# Patient Record
Sex: FEMALE | Race: BLACK OR AFRICAN AMERICAN | NOT HISPANIC OR LATINO | Employment: FULL TIME | URBAN - NONMETROPOLITAN AREA
[De-identification: names, ages, dates, MRNs, and addresses within clinical notes are randomized per-mention and may not be internally consistent; named-entity substitution may affect disease eponyms.]

---
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
## 2022-01-31 DIAGNOSIS — Z34.02 ENCOUNTER FOR SUPERVISION OF NORMAL FIRST PREGNANCY IN SECOND TRIMESTER: Primary | ICD-10-CM

## 2022-01-31 LAB
APPEARANCE, UA: CLEAR
BILIRUB UR QL STRIP: NEGATIVE MG/DL
COLOR UR: NORMAL
GLUCOSE (UA): NORMAL MG/DL
HGB UR QL STRIP: NEGATIVE /UL
KETONES UR QL STRIP: NEGATIVE MG/DL
LEUKOCYTE ESTERASE UR QL STRIP: NEGATIVE /UL
NITRITE UR QL STRIP: NEGATIVE
PH UR STRIP: 6 PH (ref 5–9)
PROT UR QL STRIP: NEGATIVE MG/DL
SP GR UR STRIP: 1.01 (ref 1–1.03)
SPECIMEN COLLECTION METHOD, URINE: NORMAL
UROBILINOGEN UR STRIP-ACNC: NORMAL MG/DL

## 2022-02-08 ENCOUNTER — PROCEDURE VISIT (OUTPATIENT)
Dept: OBSTETRICS AND GYNECOLOGY | Facility: CLINIC | Age: 22
End: 2022-02-08
Payer: MEDICAID

## 2022-02-08 ENCOUNTER — ROUTINE PRENATAL (OUTPATIENT)
Dept: OBSTETRICS AND GYNECOLOGY | Facility: CLINIC | Age: 22
End: 2022-02-08
Payer: MEDICAID

## 2022-02-08 VITALS — DIASTOLIC BLOOD PRESSURE: 80 MMHG | SYSTOLIC BLOOD PRESSURE: 127 MMHG | HEART RATE: 94 BPM | WEIGHT: 143 LBS

## 2022-02-08 DIAGNOSIS — Z3A.23 23 WEEKS GESTATION OF PREGNANCY: ICD-10-CM

## 2022-02-08 DIAGNOSIS — O09.32 LIMITED PRENATAL CARE IN SECOND TRIMESTER: ICD-10-CM

## 2022-02-08 DIAGNOSIS — Z34.02 ENCOUNTER FOR SUPERVISION OF NORMAL FIRST PREGNANCY IN SECOND TRIMESTER: Primary | ICD-10-CM

## 2022-02-08 DIAGNOSIS — Z34.02 ENCOUNTER FOR SUPERVISION OF NORMAL FIRST PREGNANCY IN SECOND TRIMESTER: ICD-10-CM

## 2022-02-08 PROCEDURE — 76805 OB US >/= 14 WKS SNGL FETUS: CPT | Mod: S$GLB,,, | Performed by: STUDENT IN AN ORGANIZED HEALTH CARE EDUCATION/TRAINING PROGRAM

## 2022-02-08 PROCEDURE — 76805 US OB/GYN PROCEDURE (VIEWPOINT): ICD-10-PCS | Mod: S$GLB,,, | Performed by: STUDENT IN AN ORGANIZED HEALTH CARE EDUCATION/TRAINING PROGRAM

## 2022-02-08 PROCEDURE — 99213 OFFICE O/P EST LOW 20 MIN: CPT | Mod: 25,TH,S$GLB, | Performed by: STUDENT IN AN ORGANIZED HEALTH CARE EDUCATION/TRAINING PROGRAM

## 2022-02-08 PROCEDURE — 99213 PR OFFICE/OUTPT VISIT, EST, LEVL III, 20-29 MIN: ICD-10-PCS | Mod: 25,TH,S$GLB, | Performed by: STUDENT IN AN ORGANIZED HEALTH CARE EDUCATION/TRAINING PROGRAM

## 2022-02-08 NOTE — PROGRESS NOTES
CC: Follow-Up OB    HPI:   21 y.o.  at 23w4d with EDC of 6/3/2022, by 8w Ultrasound, here for her follow up OB visit. Complains of nothing today. Pt has not been seen since her initial OB visit at 9 weeks.     Pregnancy ROS:  Positive fetal movement   Negative leakage of fluid   Negative vaginal bleeding   Negative headache, vision changes, RUQ pain, epigastric pain  Negative contractions/abdominal pain   Negative pelvic pressure     Physical Exam:  Prenatal Vitals  BP: 127/80  Weight: 64.9 kg (143 lb)  Urine Glucose: Negative  Urine Albumin: Negative    Wt Readings from Last 3 Encounters:   22 64.9 kg (143 lb) (74 %, Z= 0.63)*   21 68.9 kg (152 lb) (83 %, Z= 0.95)*     * Growth percentiles are based on CDC (Girls, 2-20 Years) data.     There is no height or weight on file to calculate BMI.    General: NAD, well developed, well nourished  Psych: alert and oriented to person, time and place, normal affect  HEENT: normocephalic, atraumatic  Abd: Gravid, soft, NT, ND  Skin: warm, dry  Neuro: normal gait, gross motor function intact  No cyanosis, clubbing or edema    FHTs: 155 on US    Maternal Blood Type: O+/-    ASSESSMENT: 21 y.o.  @ 23w4d with   Patient Active Problem List   Diagnosis    Anemia    Encounter for supervision of normal pregnancy in first trimester    Heart murmur     PLAN:  NIPT today  Osull on RTC  Pt encouraged to come to her appts  Anatomy scan today  Pain, fever, bleeding precautions  [facility], REECE, PreE precautions  Cont PNV, Iron  Return to clinic in 2 weeks

## 2022-02-12 ENCOUNTER — PATIENT MESSAGE (OUTPATIENT)
Dept: OBSTETRICS AND GYNECOLOGY | Facility: CLINIC | Age: 22
End: 2022-02-12
Payer: MEDICAID

## 2022-02-22 ENCOUNTER — ROUTINE PRENATAL (OUTPATIENT)
Dept: OBSTETRICS AND GYNECOLOGY | Facility: CLINIC | Age: 22
End: 2022-02-22
Payer: MEDICAID

## 2022-02-22 VITALS — SYSTOLIC BLOOD PRESSURE: 124 MMHG | HEART RATE: 90 BPM | DIASTOLIC BLOOD PRESSURE: 81 MMHG | WEIGHT: 140 LBS

## 2022-02-22 DIAGNOSIS — Z34.02 ENCOUNTER FOR SUPERVISION OF NORMAL FIRST PREGNANCY IN SECOND TRIMESTER: Primary | ICD-10-CM

## 2022-02-22 DIAGNOSIS — Z3A.25 25 WEEKS GESTATION OF PREGNANCY: ICD-10-CM

## 2022-02-22 PROCEDURE — 99213 PR OFFICE/OUTPT VISIT, EST, LEVL III, 20-29 MIN: ICD-10-PCS | Mod: TH,S$GLB,, | Performed by: STUDENT IN AN ORGANIZED HEALTH CARE EDUCATION/TRAINING PROGRAM

## 2022-02-22 PROCEDURE — 99213 OFFICE O/P EST LOW 20 MIN: CPT | Mod: TH,S$GLB,, | Performed by: STUDENT IN AN ORGANIZED HEALTH CARE EDUCATION/TRAINING PROGRAM

## 2022-02-22 NOTE — LETTER
February 22, 2022      [facility]) - OB GYN  [address]  [place] [address]  Phone: [phone]  Fax: [phone]       Patient: Abhay Schilling   YOB: 2000  Date of Visit: 02/22/2022    To Whom It May Concern:    Az Schilling  was at [facility] [employer] on 02/22/2022. The patient may work without restrictions. If you have any questions or concerns, or if I can be of further assistance, please do not hesitate to contact me.    Sincerely,      MD Arianna Tan LPN

## 2022-02-22 NOTE — PROGRESS NOTES
CC: Follow-Up OB    HPI:   21 y.o.  at 25w4d with EDC of 6/3/2022, by 8w Ultrasound, here for her follow up OB visit. Complains of nothing today.    Pregnancy ROS:  Positive fetal movement   Negative leakage of fluid   Negative vaginal bleeding   Negative headache, vision changes, RUQ pain, epigastric pain  Negative contractions/abdominal pain   Negative pelvic pressure     Physical Exam:  Prenatal Vitals  BP: 124/81  Weight: 63.5 kg (140 lb)  Urine Glucose: Negative  Urine Albumin: Negative    Wt Readings from Last 3 Encounters:   22 63.5 kg (140 lb) (70 %, Z= 0.52)*   22 64.9 kg (143 lb) (74 %, Z= 0.63)*   21 68.9 kg (152 lb) (83 %, Z= 0.95)*     * Growth percentiles are based on [facility] (Girls, 2-20 Years) data.     There is no height or weight on file to calculate BMI.    General: NAD, well developed, well nourished  Psych: alert and oriented to person, time and place, normal affect  HEENT: normocephalic, atraumatic  Abd: Gravid, soft, NT, ND  Skin: warm, dry  Neuro: normal gait, gross motor function intact  No cyanosis, clubbing or edema    FHTs: 150's    Maternal Blood Type: O+/-    ASSESSMENT: 21 y.o.  @ 25w4d with   Patient Active Problem List   Diagnosis    Anemia    Encounter for supervision of normal pregnancy in first trimester    Heart murmur     PLAN:  NIPT and OSull today  3rd trimester labs and tdap on RTC   Pain, fever, bleeding precautions  [facility], REECE, PreE precautions  Cont PNV, Iron  Return to clinic in 3 weeks

## 2022-03-02 ENCOUNTER — TELEPHONE (OUTPATIENT)
Dept: OBSTETRICS AND GYNECOLOGY | Facility: CLINIC | Age: 22
End: 2022-03-02
Payer: MEDICAID

## 2022-03-02 NOTE — TELEPHONE ENCOUNTER
Pt advised of NIPT results, Pt educated on Labor precautions regarding concerns of pain, pt voiced understaning.     ----- Message from Michele Claudio sent at 3/2/2022 12:54 PM CST -----  Contact: Patient  Patient need the nurse to call her      Call back #  [phone]

## 2022-03-15 ENCOUNTER — ROUTINE PRENATAL (OUTPATIENT)
Dept: OBSTETRICS AND GYNECOLOGY | Facility: CLINIC | Age: 22
End: 2022-03-15
Payer: MEDICAID

## 2022-03-15 VITALS — DIASTOLIC BLOOD PRESSURE: 83 MMHG | WEIGHT: 144 LBS | HEART RATE: 89 BPM | SYSTOLIC BLOOD PRESSURE: 121 MMHG

## 2022-03-15 DIAGNOSIS — Z34.03 ENCOUNTER FOR SUPERVISION OF NORMAL FIRST PREGNANCY IN THIRD TRIMESTER: Primary | ICD-10-CM

## 2022-03-15 DIAGNOSIS — Z3A.28 28 WEEKS GESTATION OF PREGNANCY: ICD-10-CM

## 2022-03-15 LAB
ABS NRBC COUNT: 0 X 10 3/UL (ref 0–0.01)
ABSOLUTE BASOPHIL: 0.05 X 10 3/UL (ref 0–0.22)
ABSOLUTE EOSINOPHIL: 0.06 X 10 3/UL (ref 0.04–0.54)
ABSOLUTE IMMATURE GRAN: 0.07 X 10 3/UL (ref 0–0.04)
ABSOLUTE LYMPHOCYTE: 1.48 X 10 3/UL (ref 0.86–4.75)
ABSOLUTE MONOCYTE: 1.16 X 10 3/UL (ref 0.22–1.08)
BASOPHILS NFR BLD: 0.4 % (ref 0.2–1.2)
EOSINOPHIL NFR BLD: 0.5 % (ref 0.7–7)
HCT VFR BLD AUTO: 31 % (ref 37–47)
HGB BLD-MCNC: 8.8 G/DL (ref 12–16)
HIV 1+2 AB+HIV1 P24 AG SERPL QL IA: NONREACTIVE
IMMATURE GRANULOCYTES: 0.6 % (ref 0–0.5)
LYMPHOCYTES NFR BLD: 12.7 % (ref 19.3–53.1)
MCH RBC QN AUTO: 19.2 PG (ref 27–32)
MCHC RBC AUTO-ENTMCNC: 28.4 G/DL (ref 32–36)
MCV RBC AUTO: 67.7 FL (ref 82–100)
MONOCYTES NFR BLD: 10 % (ref 4.7–12.5)
NEUTROPHILS # BLD AUTO: 8.8 X 10 3/UL (ref 2.15–7.56)
NEUTROPHILS NFR BLD: 75.8 % (ref 34–71.1)
NUCLEATED RED BLOOD CELLS: 0 /100 WBC (ref 0–0.2)
PLATELET # BLD AUTO: 200 X 10 3/UL (ref 135–400)
RBC # BLD AUTO: 4.58 X 10 6/UL (ref 4.2–5.4)
RDW-SD: 43.6 FL (ref 37–54)
SYPHILIS TREPONEMAL ANTIBODY: NONREACTIVE
WBC # BLD: 11.62 X 10 3/UL (ref 4.3–10.8)

## 2022-03-15 PROCEDURE — 90471 TDAP VACCINE GREATER THAN OR EQUAL TO 7YO IM: ICD-10-PCS | Mod: S$GLB,,, | Performed by: STUDENT IN AN ORGANIZED HEALTH CARE EDUCATION/TRAINING PROGRAM

## 2022-03-15 PROCEDURE — 99213 PR OFFICE/OUTPT VISIT, EST, LEVL III, 20-29 MIN: ICD-10-PCS | Mod: TH,25,S$GLB, | Performed by: STUDENT IN AN ORGANIZED HEALTH CARE EDUCATION/TRAINING PROGRAM

## 2022-03-15 PROCEDURE — 99213 OFFICE O/P EST LOW 20 MIN: CPT | Mod: TH,25,S$GLB, | Performed by: STUDENT IN AN ORGANIZED HEALTH CARE EDUCATION/TRAINING PROGRAM

## 2022-03-15 PROCEDURE — 90715 TDAP VACCINE 7 YRS/> IM: CPT | Mod: S$GLB,,, | Performed by: STUDENT IN AN ORGANIZED HEALTH CARE EDUCATION/TRAINING PROGRAM

## 2022-03-15 PROCEDURE — 90471 IMMUNIZATION ADMIN: CPT | Mod: S$GLB,,, | Performed by: STUDENT IN AN ORGANIZED HEALTH CARE EDUCATION/TRAINING PROGRAM

## 2022-03-15 PROCEDURE — 90715 TDAP VACCINE GREATER THAN OR EQUAL TO 7YO IM: ICD-10-PCS | Mod: S$GLB,,, | Performed by: STUDENT IN AN ORGANIZED HEALTH CARE EDUCATION/TRAINING PROGRAM

## 2022-03-15 NOTE — PROGRESS NOTES
CC: Follow-Up OB    HPI:   21 y.o.  at 28w4d with EDC of 6/3/2022, by 8w Ultrasound, here for her follow up OB visit. Complains of nothing today. She does report occasional cramping when she works.     Pregnancy ROS:  Positive fetal movement   Negative leakage of fluid   Negative vaginal bleeding   Negative headache, vision changes, RUQ pain, epigastric pain  Negative contractions/abdominal pain   Negative pelvic pressure     Physical Exam:  Prenatal Vitals  BP: 121/83  Weight: 65.3 kg (144 lb)  Urine Glucose: Negative  Urine Albumin: Negative    Wt Readings from Last 3 Encounters:   03/15/22 65.3 kg (144 lb) (74 %, Z= 0.66)*   22 63.5 kg (140 lb) (70 %, Z= 0.52)*   22 64.9 kg (143 lb) (74 %, Z= 0.63)*     * Growth percentiles are based on [facility] (Girls, 2-20 Years) data.     There is no height or weight on file to calculate BMI.    General: NAD, well developed, well nourished  Psych: alert and oriented to person, time and place, normal affect  HEENT: normocephalic, atraumatic  Abd: Gravid, soft, NT, ND  Skin: warm, dry  Neuro: normal gait, gross motor function intact  No cyanosis, clubbing or edema    FHTs: 140's    Maternal Blood Type: O+/-    ASSESSMENT: 21 y.o.  @ 28w4d with   Patient Active Problem List   Diagnosis    Anemia    Encounter for supervision of normal pregnancy in first trimester    Heart murmur     PLAN:  NIPT negative  Osull wnl  3rd trimester labs and tdap today  Pain, fever, bleeding precautions  VAMSHI, REECE, PreE precautions  Cont PNV, Iron  Return to clinic in 3 weeks

## 2022-03-16 RX ORDER — FERROUS SULFATE 325(65) MG
325 TABLET, DELAYED RELEASE (ENTERIC COATED) ORAL 2 TIMES DAILY
Qty: 60 TABLET | Refills: 2 | Status: SHIPPED | OUTPATIENT
Start: 2022-03-16 | End: 2022-06-14

## 2022-03-23 ENCOUNTER — PATIENT MESSAGE (OUTPATIENT)
Dept: OBSTETRICS AND GYNECOLOGY | Facility: CLINIC | Age: 22
End: 2022-03-23
Payer: MEDICAID

## 2022-03-26 ENCOUNTER — HISTORICAL (OUTPATIENT)
Dept: ADMINISTRATIVE | Facility: HOSPITAL | Age: 22
End: 2022-03-26

## 2022-03-29 ENCOUNTER — TELEPHONE (OUTPATIENT)
Dept: OBSTETRICS AND GYNECOLOGY | Facility: CLINIC | Age: 22
End: 2022-03-29
Payer: MEDICAID

## 2022-03-29 NOTE — TELEPHONE ENCOUNTER
----- Message from Radha White sent at 3/29/2022  9:00 AM CDT -----  Regarding: pt advice  Contact: Pt  Pt states that since her last visit she has been seen in the ER twice. States that on the 1st ER she was put on bed rest and on the 2nd ER visit she was told she has pre-eclampsia. Pt states that she has been unable to eat or drank due to vomiting. Please call back at [phone]//thank you acc

## 2022-03-29 NOTE — TELEPHONE ENCOUNTER
----- Message from Radha White sent at 3/29/2022  9:00 AM CDT -----  Regarding: pt advice  Contact: Pt  Pt states that since her last visit she has been seen in the ER twice. States that on the 1st ER she was put on bed rest and on the 2nd ER visit she was told she has pre-eclampsia. Pt states that she has been unable to eat or drank due to vomiting. Please call back at [phone]//thank you acc

## 2022-03-31 ENCOUNTER — ROUTINE PRENATAL (OUTPATIENT)
Dept: OBSTETRICS AND GYNECOLOGY | Facility: CLINIC | Age: 22
End: 2022-03-31
Payer: MEDICAID

## 2022-03-31 VITALS — WEIGHT: 139 LBS | HEART RATE: 89 BPM | SYSTOLIC BLOOD PRESSURE: 130 MMHG | DIASTOLIC BLOOD PRESSURE: 84 MMHG

## 2022-03-31 DIAGNOSIS — Z34.03 ENCOUNTER FOR SUPERVISION OF NORMAL FIRST PREGNANCY IN THIRD TRIMESTER: ICD-10-CM

## 2022-03-31 DIAGNOSIS — O21.9 NAUSEA AND VOMITING IN PREGNANCY: Primary | ICD-10-CM

## 2022-03-31 DIAGNOSIS — Z3A.30 30 WEEKS GESTATION OF PREGNANCY: ICD-10-CM

## 2022-03-31 PROCEDURE — 99213 PR OFFICE/OUTPT VISIT, EST, LEVL III, 20-29 MIN: ICD-10-PCS | Mod: TH,S$GLB,, | Performed by: STUDENT IN AN ORGANIZED HEALTH CARE EDUCATION/TRAINING PROGRAM

## 2022-03-31 PROCEDURE — 99213 OFFICE O/P EST LOW 20 MIN: CPT | Mod: TH,S$GLB,, | Performed by: STUDENT IN AN ORGANIZED HEALTH CARE EDUCATION/TRAINING PROGRAM

## 2022-03-31 RX ORDER — PROMETHAZINE HYDROCHLORIDE 12.5 MG/1
12.5 SUPPOSITORY RECTAL EVERY 6 HOURS PRN
Qty: 12 SUPPOSITORY | Refills: 1 | Status: SHIPPED | OUTPATIENT
Start: 2022-03-31 | End: 2023-03-31

## 2022-03-31 RX ORDER — ONDANSETRON 4 MG/1
4 TABLET, FILM COATED ORAL EVERY 6 HOURS PRN
Qty: 30 TABLET | Refills: 1 | Status: SHIPPED | OUTPATIENT
Start: 2022-03-31 | End: 2023-03-31

## 2022-03-31 RX ORDER — PROMETHAZINE HYDROCHLORIDE 25 MG/1
25 TABLET ORAL EVERY 6 HOURS
Qty: 28 TABLET | Refills: 0 | Status: SHIPPED | OUTPATIENT
Start: 2022-03-31 | End: 2022-04-07

## 2022-03-31 NOTE — PROGRESS NOTES
CC: Follow-Up OB    HPI:   21 y.o.  at 30w6d with EDC of 6/3/2022, by Ultrasound, here for her follow up OB visit. Complains of n/v, reports difficulty holding down meals and liquids. She was seen in the ED twice, given zofran however she has trouble keeping it down. 6 lb weight loss from previous visit.    Pregnancy ROS:  Positive fetal movement   Negative leakage of fluid   Negative vaginal bleeding   Negative headache, vision changes, RUQ pain, epigastric pain  Negative contractions/abdominal pain   Negative pelvic pressure     Physical Exam:  Prenatal Vitals  BP: 130/84  Weight: 63 kg (139 lb)  Urine Glucose: Negative  Urine Albumin: Negative    Wt Readings from Last 3 Encounters:   22 63 kg (139 lb) (68 %, Z= 0.47)*   03/15/22 65.3 kg (144 lb) (74 %, Z= 0.66)*   22 63.5 kg (140 lb) (70 %, Z= 0.52)*     * Growth percentiles are based on CDC (Girls, 2-20 Years) data.     There is no height or weight on file to calculate BMI.    General: NAD, well developed, well nourished  Psych: alert and oriented to person, time and place, normal affect  HEENT: normocephalic, atraumatic  Abd: Gravid, soft, NT, ND  Skin: warm, dry  Neuro: normal gait, gross motor function intact  No cyanosis, clubbing or edema    FHTs: 150's    Maternal Blood Type: O+/-    ASSESSMENT: 21 y.o.  @ 30w6d with   Patient Active Problem List   Diagnosis    Anemia    Encounter for supervision of normal pregnancy in first trimester    Heart murmur     PLAN:  Pt with n/v, will treat with zofran and phenergan  Will Po phenergan and suppositories  If pt is still having difficulty holding down liquids will admit pt to [facility] For IVF and antiemetics  Continue iron BID  Pain, fever, bleeding precautions  VAMSHI, REECE, PreE precautions  Cont PNV, Iron  Return to clinic in 1 weeks

## 2022-04-02 ENCOUNTER — PATIENT MESSAGE (OUTPATIENT)
Dept: OBSTETRICS AND GYNECOLOGY | Facility: CLINIC | Age: 22
End: 2022-04-02
Payer: MEDICAID

## 2022-04-02 LAB
APPEARANCE, UA: CLEAR
BACTERIA SPEC CULT: ABNORMAL /HPF
BILIRUB UR QL STRIP: NEGATIVE MG/DL
COLOR UR: ABNORMAL
GLUCOSE (UA): NORMAL MG/DL
HGB UR QL STRIP: NEGATIVE /UL
KETONES UR QL STRIP: ABNORMAL MG/DL
LEUKOCYTE ESTERASE UR QL STRIP: 500 /UL
NITRITE UR QL STRIP: NEGATIVE
PH UR STRIP: 6.5 PH (ref 5–9)
PROT UR QL STRIP: ABNORMAL MG/DL
RBC #/AREA URNS HPF: ABNORMAL /HPF (ref 0–2)
SERVICE COMMENT 03: ABNORMAL
SP GR UR STRIP: 1.01 (ref 1–1.03)
SPECIMEN COLLECTION METHOD, URINE: ABNORMAL
SQUAMOUS EPITHELIAL, UA: ABNORMAL /LPF
UROBILINOGEN UR STRIP-ACNC: NORMAL MG/DL
WBC #/AREA URNS HPF: ABNORMAL /HPF (ref 0–5)

## 2022-04-04 ENCOUNTER — PATIENT MESSAGE (OUTPATIENT)
Dept: OBSTETRICS AND GYNECOLOGY | Facility: CLINIC | Age: 22
End: 2022-04-04
Payer: MEDICAID

## 2022-04-04 ENCOUNTER — TELEPHONE (OUTPATIENT)
Dept: OBSTETRICS AND GYNECOLOGY | Facility: CLINIC | Age: 22
End: 2022-04-04
Payer: MEDICAID

## 2022-04-04 NOTE — TELEPHONE ENCOUNTER
Patient reports she went to the hospital Saturday for N/V and chest pain. Verbalized she received fluids and they monitored her and baby. States she is feeling better and is keeping everything down. Informed patient if she starts to have chest pain and develops N/V again to go to L&D, verbalized understanding.

## 2022-04-20 LAB
APPEARANCE, UA: CLEAR
BACTERIA SPEC CULT: ABNORMAL /HPF
BILIRUB UR QL STRIP: NEGATIVE MG/DL
COLOR UR: YELLOW
GLUCOSE (UA): NORMAL MG/DL
HGB UR QL STRIP: NEGATIVE /UL
KETONES UR QL STRIP: NEGATIVE MG/DL
LEUKOCYTE ESTERASE UR QL STRIP: 500 /UL
NITRITE UR QL STRIP: NEGATIVE
PH UR STRIP: 7 PH (ref 5–9)
PROT UR QL STRIP: ABNORMAL MG/DL
RBC #/AREA URNS HPF: ABNORMAL /HPF (ref 0–2)
SERVICE COMMENT 03: ABNORMAL
SP GR UR STRIP: 1.01 (ref 1–1.03)
SPECIMEN COLLECTION METHOD, URINE: ABNORMAL
SQUAMOUS EPITHELIAL, UA: ABNORMAL /LPF
UROBILINOGEN UR STRIP-ACNC: NORMAL MG/DL
WBC #/AREA URNS HPF: ABNORMAL /HPF (ref 0–5)

## 2022-04-21 ENCOUNTER — ROUTINE PRENATAL (OUTPATIENT)
Dept: OBSTETRICS AND GYNECOLOGY | Facility: CLINIC | Age: 22
End: 2022-04-21
Payer: MEDICAID

## 2022-04-21 VITALS — SYSTOLIC BLOOD PRESSURE: 127 MMHG | DIASTOLIC BLOOD PRESSURE: 83 MMHG | HEART RATE: 94 BPM | WEIGHT: 144 LBS

## 2022-04-21 DIAGNOSIS — Z3A.33 33 WEEKS GESTATION OF PREGNANCY: ICD-10-CM

## 2022-04-21 DIAGNOSIS — Z34.03 ENCOUNTER FOR SUPERVISION OF NORMAL FIRST PREGNANCY IN THIRD TRIMESTER: Primary | ICD-10-CM

## 2022-04-21 PROCEDURE — 99213 PR OFFICE/OUTPT VISIT, EST, LEVL III, 20-29 MIN: ICD-10-PCS | Mod: TH,S$GLB,, | Performed by: STUDENT IN AN ORGANIZED HEALTH CARE EDUCATION/TRAINING PROGRAM

## 2022-04-21 PROCEDURE — 99213 OFFICE O/P EST LOW 20 MIN: CPT | Mod: TH,S$GLB,, | Performed by: STUDENT IN AN ORGANIZED HEALTH CARE EDUCATION/TRAINING PROGRAM

## 2022-04-21 NOTE — PROGRESS NOTES
CC: Follow-Up OB    HPI:   21 y.o.  at 33w6d with EDC of 6/3/2022, by 8w Ultrasound, here for her follow up OB visit. Complains of nothing today. Pt was treated for n/v at her last visit on 3/31, pt her last 2 appts. She was seen in the [facility] last night for abd pain. Pt was given IVF fluids with improvement. She reports no pain today.    Pregnancy ROS:  Positive fetal movement   Negative leakage of fluid   Negative vaginal bleeding   Negative headache, vision changes, RUQ pain, epigastric pain  Negative contractions/abdominal pain   Negative pelvic pressure     Physical Exam:  Prenatal Vitals  BP: 127/83  Weight: 65.3 kg (144 lb)  Urine Glucose: Negative  Urine Albumin: Negative    Wt Readings from Last 3 Encounters:   22 65.3 kg (144 lb) (74 %, Z= 0.65)*   22 63 kg (139 lb) (68 %, Z= 0.47)*   03/15/22 65.3 kg (144 lb) (74 %, Z= 0.66)*     * Growth percentiles are based on CDC (Girls, 2-20 Years) data.     There is no height or weight on file to calculate BMI.    General: NAD, well developed, well nourished  Psych: alert and oriented to person, time and place, normal affect  HEENT: normocephalic, atraumatic  Abd: Gravid, soft, NT, ND  Skin: warm, dry  Neuro: normal gait, gross motor function intact  No cyanosis, clubbing or edema    FHTs: 140's    Maternal Blood Type: O+/-    ASSESSMENT: 21 y.o.  @ 33w6d with   Patient Active Problem List   Diagnosis    Anemia    Encounter for supervision of normal pregnancy in first trimester    Heart murmur     PLAN:  N/v improved, not requiring meds at this time  Pt encouraged to increase her water intake  GBS on RTC   Pain, fever, bleeding precautions  VAMSHI, REECE, PreE precautions  Cont PNV, Iron  Return to clinic in 2 weeks

## 2022-04-23 LAB — URINE CULTURE, ROUTINE: NORMAL

## 2022-05-01 ENCOUNTER — PATIENT MESSAGE (OUTPATIENT)
Dept: OBSTETRICS AND GYNECOLOGY | Facility: CLINIC | Age: 22
End: 2022-05-01
Payer: MEDICAID

## 2022-05-05 ENCOUNTER — TELEPHONE (OUTPATIENT)
Dept: OBSTETRICS AND GYNECOLOGY | Facility: CLINIC | Age: 22
End: 2022-05-05
Payer: MEDICAID

## 2022-05-05 ENCOUNTER — HISTORICAL (OUTPATIENT)
Dept: ADMINISTRATIVE | Facility: HOSPITAL | Age: 22
End: 2022-05-05

## 2022-05-05 NOTE — TELEPHONE ENCOUNTER
Spoke w/ Dr. Ragsdale, he stated pt. Presented to ED on 22 in -labor contractions. Pt admitted as inpatient for observation. Pt at 2/3 centimeters dialated and pre-eclamptic with b/p running from 140/9's Brethine and Celestone given with 2nd dose to be given at D/C 22. Dr. Ragsdale felt pt stable to go home and b/p was 130/79.       ----- Message from Kenyatta Das sent at 2022  1:14 PM CDT -----  Contact: Dr Ragsdale([facility])  Dr Ragsdale called to consult with nurse or staff regarding the patient. He states the patient is under observation. He would like a call back and  can be reached at [phone] or [phone]. Thanks/MR

## 2022-05-06 ENCOUNTER — OUTSIDE PLACE OF SERVICE (OUTPATIENT)
Dept: OBSTETRICS AND GYNECOLOGY | Facility: CLINIC | Age: 22
End: 2022-05-06
Payer: MEDICAID

## 2022-05-06 LAB
ANISOCYTOSIS: ABNORMAL
BANDS: 1 % (ref 0–5)
CELLS COUNTED: 100
ERYTHROCYTE [DISTWIDTH] IN BLOOD BY AUTOMATED COUNT: 19 % (ref 12.5–18)
HCT VFR BLD AUTO: 32.8 % (ref 37–47)
HGB BLD-MCNC: 9.2 G/DL (ref 12–16)
HYPOCHROMIA BLD QL SMEAR: ABNORMAL
LYMPHOCYTES NFR BLD: 9 % (ref 25–40)
MCH RBC QN AUTO: 18.2 PG (ref 27–31.2)
MCHC RBC AUTO-ENTMCNC: 28 G/DL (ref 31.8–35.4)
MCV RBC AUTO: 64.8 FL (ref 80–97)
MICROCYTES BLD QL SMEAR: ABNORMAL
MONOCYTES NFR BLD: 8 % (ref 1–15)
NEUTROPHILS # BLD AUTO: 18.4 10*3/UL (ref 1.8–8)
NEUTROPHILS NFR BLD: 82 % (ref 37–80)
NUCLEATED RED BLOOD CELLS: 0.1 %
PLATELETS: 220 10*3/UL (ref 142–424)
RBC # BLD AUTO: 5.06 10*6/UL (ref 4.2–5.4)
RPR: NON REACTIVE
SMALL PLATELETS BLD QL SMEAR: ADEQUATE
WBC # BLD: 22.2 10*3/UL (ref 4.6–10.2)

## 2022-05-06 PROCEDURE — 0502F SUBSEQUENT PRENATAL CARE: CPT | Mod: ,,, | Performed by: STUDENT IN AN ORGANIZED HEALTH CARE EDUCATION/TRAINING PROGRAM

## 2022-05-06 PROCEDURE — 0502F PR SUBSEQUENT PRENATAL CARE: ICD-10-PCS | Mod: ,,, | Performed by: STUDENT IN AN ORGANIZED HEALTH CARE EDUCATION/TRAINING PROGRAM

## 2022-05-06 PROCEDURE — 59514 CESAREAN DELIVERY ONLY: CPT | Mod: AT,,, | Performed by: STUDENT IN AN ORGANIZED HEALTH CARE EDUCATION/TRAINING PROGRAM

## 2022-05-06 PROCEDURE — 59514 PR CESAREAN DELIVERY ONLY: ICD-10-PCS | Mod: AT,,, | Performed by: STUDENT IN AN ORGANIZED HEALTH CARE EDUCATION/TRAINING PROGRAM

## 2022-05-07 LAB
ERYTHROCYTE [DISTWIDTH] IN BLOOD BY AUTOMATED COUNT: 18.6 % (ref 12.5–18)
HCT VFR BLD AUTO: 22.9 % (ref 37–47)
HGB BLD-MCNC: 6.5 G/DL (ref 12–16)
MCH RBC QN AUTO: 18.2 PG (ref 27–31.2)
MCHC RBC AUTO-ENTMCNC: 28.4 G/DL (ref 31.8–35.4)
MCV RBC AUTO: 64.1 FL (ref 80–97)
NUCLEATED RED BLOOD CELLS: 0.1 %
PLATELETS: 201 10*3/UL (ref 142–424)
RBC # BLD AUTO: 3.57 10*6/UL (ref 4.2–5.4)
WBC # BLD: 21.6 10*3/UL (ref 4.6–10.2)

## 2022-05-07 PROCEDURE — 99238 HOSP IP/OBS DSCHRG MGMT 30/<: CPT | Mod: ,,, | Performed by: STUDENT IN AN ORGANIZED HEALTH CARE EDUCATION/TRAINING PROGRAM

## 2022-05-07 PROCEDURE — 99238 PR HOSPITAL DISCHARGE DAY,<30 MIN: ICD-10-PCS | Mod: ,,, | Performed by: STUDENT IN AN ORGANIZED HEALTH CARE EDUCATION/TRAINING PROGRAM

## 2022-05-12 ENCOUNTER — PATIENT MESSAGE (OUTPATIENT)
Dept: OBSTETRICS AND GYNECOLOGY | Facility: CLINIC | Age: 22
End: 2022-05-12
Payer: MEDICAID

## 2022-05-16 ENCOUNTER — POSTPARTUM VISIT (OUTPATIENT)
Dept: OBSTETRICS AND GYNECOLOGY | Facility: CLINIC | Age: 22
End: 2022-05-16
Payer: MEDICAID

## 2022-05-16 VITALS
WEIGHT: 122.63 LBS | DIASTOLIC BLOOD PRESSURE: 88 MMHG | SYSTOLIC BLOOD PRESSURE: 143 MMHG | HEART RATE: 99 BPM | BODY MASS INDEX: 21.73 KG/M2 | HEIGHT: 63 IN

## 2022-05-16 DIAGNOSIS — Z98.891 S/P C-SECTION: Primary | ICD-10-CM

## 2022-05-16 PROCEDURE — 59430 PR CARE AFTER DELIVERY ONLY: ICD-10-PCS | Mod: S$GLB,,, | Performed by: STUDENT IN AN ORGANIZED HEALTH CARE EDUCATION/TRAINING PROGRAM

## 2022-05-16 NOTE — PROGRESS NOTES
"Chief Complaint: post op C/s    HPI: Patient is a 21 y.o. y.o. female  who presents to clinic for post op C/s visit. Pt reports mild pain around her scar, VB is minimal. She is ambulating, voiding and tolerating PO diet. She does reports feeling overwhelmed at times, and is worried about postpartum depression. She denies any HI/SI today. She has no other complaints today. ROS negative unless mentioned above.     Physical Exam:  Vitals:    22 1056   BP: (!) 143/88   Pulse: 99   Weight: 55.6 kg (122 lb 9.6 oz)   Height: 5' 3" (1.6 m)   Body mass index is 21.72 kg/m².    Gen: NAD, well developed, well nourished  Psych: alert and oriented x 3, normal affect  HEENT: normocephalic, atraumatic  Abd: soft, non-tender, non-distended, pfann incision healing well  Skin: warm and dry  Ext: no c/c/c, moves all extremities  Neurological: normal gait, gross motor function intact    Assessment: Patient is a 21 y.o. y.o. female  with  Patient Active Problem List   Diagnosis    Anemia    Encounter for supervision of normal pregnancy in first trimester    Heart murmur    S/P      Plan:  Pt counseled on postpartum blues and depression  Denies HI/SI today  If her symptoms continue will discuss medication for treatment  Pt to continue post op restrictions   RTC in 3 weeks     Fermin Zeng MD        "

## 2022-05-23 ENCOUNTER — PATIENT MESSAGE (OUTPATIENT)
Dept: OBSTETRICS AND GYNECOLOGY | Facility: CLINIC | Age: 22
End: 2022-05-23
Payer: MEDICAID

## 2022-06-06 ENCOUNTER — POSTPARTUM VISIT (OUTPATIENT)
Dept: OBSTETRICS AND GYNECOLOGY | Facility: CLINIC | Age: 22
End: 2022-06-06
Payer: MEDICAID

## 2022-06-06 ENCOUNTER — PATIENT MESSAGE (OUTPATIENT)
Dept: OBSTETRICS AND GYNECOLOGY | Facility: CLINIC | Age: 22
End: 2022-06-06

## 2022-06-06 VITALS — SYSTOLIC BLOOD PRESSURE: 122 MMHG | DIASTOLIC BLOOD PRESSURE: 81 MMHG | HEART RATE: 109 BPM

## 2022-06-06 DIAGNOSIS — M79.10 MUSCLE PAIN: Primary | ICD-10-CM

## 2022-06-06 DIAGNOSIS — Z76.89 ENCOUNTER TO ESTABLISH CARE: ICD-10-CM

## 2022-06-06 PROCEDURE — 99213 PR OFFICE/OUTPT VISIT, EST, LEVL III, 20-29 MIN: ICD-10-PCS | Mod: 24,S$GLB,, | Performed by: STUDENT IN AN ORGANIZED HEALTH CARE EDUCATION/TRAINING PROGRAM

## 2022-06-06 PROCEDURE — 99213 OFFICE O/P EST LOW 20 MIN: CPT | Mod: 24,S$GLB,, | Performed by: STUDENT IN AN ORGANIZED HEALTH CARE EDUCATION/TRAINING PROGRAM

## 2022-06-06 NOTE — PROGRESS NOTES
Chief Complaint: muscle spasms/nerve problems    HPI: Patient is a 21 y.o. y.o. female  who presents to clinic for complaints of muscle and nerve problems. She reports about 2 weeks after her surgery she started having pain on her right side and then started having spasms in her right arm, hand and leg. She has been to the ED twice and reports she was told she was having muscle spasms and later tremors. She also reports some HA that are worse when she is sitting up or standing. She has no other complaints today. ROS negative unless mentioned above.    Physical Exam:  Vitals:    22 1027   BP: 122/81   Pulse: 109   There is no height or weight on file to calculate BMI.    Gen: NAD, well developed, well nourished  Psych: alert and oriented x 3, normal affect  HEENT: normocephalic, atraumatic  Abd: soft, non-tender, non-distended, pfann incision well healed  Skin: warm and dry  Ext: no c/c/c, moves all extremities  Neurological: normal gait, gross motor function intact    Assessment: Patient is a 21 y.o. y.o. female  with  Patient Active Problem List   Diagnosis    Anemia    Encounter for supervision of normal pregnancy in first trimester    Heart murmur    S/P        Plan:  Pt with complaints of spasms and pain on the right side involving her arm, hand and leg  Pt had CT in ED, reports it was normal  Will refer to PCP for further evaluation for muscular v nerve abnormality  RTC for 6 week visit in 2 week    Fermin Zeng MD

## 2022-06-13 ENCOUNTER — TELEPHONE (OUTPATIENT)
Dept: OBSTETRICS AND GYNECOLOGY | Facility: CLINIC | Age: 22
End: 2022-06-13
Payer: MEDICAID

## 2022-06-13 NOTE — TELEPHONE ENCOUNTER
Will initiate referral process.     ----- Message from Radha White sent at 6/13/2022 10:29 AM CDT -----  Regarding: referral request  Contact: pt  Type:  Patient Requesting Referral    Who Called: Abhay Schilling   Does the patient already have the specialty appointment scheduled?:no  If yes, what is the date of that appointment?:   Referral to What Specialty: Neurology   Reason for Referral: poss nerve damage   Does the patient want the referral with a specific physician?: [facility]   [facility]   Patient Requesting a Response?: yes   Would the patient rather a call back or a response via [facility]?  Call back   Best Call Back Number: [phone]  Additional Information:

## 2022-06-14 ENCOUNTER — PATIENT MESSAGE (OUTPATIENT)
Dept: OBSTETRICS AND GYNECOLOGY | Facility: CLINIC | Age: 22
End: 2022-06-14
Payer: MEDICAID

## 2022-06-17 ENCOUNTER — PATIENT MESSAGE (OUTPATIENT)
Dept: OBSTETRICS AND GYNECOLOGY | Facility: CLINIC | Age: 22
End: 2022-06-17
Payer: MEDICAID

## 2022-06-20 ENCOUNTER — POSTPARTUM VISIT (OUTPATIENT)
Dept: OBSTETRICS AND GYNECOLOGY | Facility: CLINIC | Age: 22
End: 2022-06-20
Payer: MEDICAID

## 2022-06-20 VITALS
DIASTOLIC BLOOD PRESSURE: 85 MMHG | WEIGHT: 121 LBS | SYSTOLIC BLOOD PRESSURE: 121 MMHG | BODY MASS INDEX: 21.43 KG/M2 | HEART RATE: 96 BPM

## 2022-06-20 DIAGNOSIS — Z98.891 S/P C-SECTION: Primary | ICD-10-CM

## 2022-06-20 PROBLEM — Z34.91 ENCOUNTER FOR SUPERVISION OF NORMAL PREGNANCY IN FIRST TRIMESTER: Status: RESOLVED | Noted: 2021-11-01 | Resolved: 2022-06-20

## 2022-06-20 PROCEDURE — 0503F POSTPARTUM CARE VISIT: CPT | Mod: S$GLB,,, | Performed by: STUDENT IN AN ORGANIZED HEALTH CARE EDUCATION/TRAINING PROGRAM

## 2022-06-20 PROCEDURE — 0503F PR POSTPARTUM CARE VISIT: ICD-10-PCS | Mod: S$GLB,,, | Performed by: STUDENT IN AN ORGANIZED HEALTH CARE EDUCATION/TRAINING PROGRAM

## 2022-06-20 RX ORDER — NORETHINDRONE 0.35 MG/1
1 TABLET ORAL DAILY
Qty: 90 TABLET | Refills: 3 | Status: SHIPPED | OUTPATIENT
Start: 2022-06-20 | End: 2023-06-20

## 2022-06-20 NOTE — PROGRESS NOTES
Pt is a 22 yo BF s/p C-s presenting to clinic for 6 week PP visit. She is doing well today. She still reports some discomfort in the area where she had spinal. No other complaints. She denies VB/VD/dysuria/Denies any HA, vision changes, F/C, LE swelling. Denies any breast pain/soreness. Denies postpartum depression. Pt is breast and bottle feeding without issues. Pt requesting OCPs for form of birth control.     Vitals:    22 1411   BP: 121/85   Pulse: 96   Weight: 54.9 kg (121 lb)     Body mass index is 21.43 kg/m².  Gen:WDWN in NAD  NC/AT  Non labored breathing   Abd soft, NT/ND, pfann incision well healed  Skin: warm and dry  Ext no edema    Patient Active Problem List   Diagnosis    Anemia    Encounter for supervision of normal pregnancy in first trimester    Heart murmur    S/P      Plan   Pt doing well today  Pt released from post op restrictions  Desires OCP for contraception, will prescribe micronor as pt is breast feeding  UPT negative  RTC in 3 months

## 2023-02-27 PROCEDURE — 99285 EMERGENCY DEPT VISIT HI MDM: CPT

## 2023-02-28 ENCOUNTER — HOSPITAL ENCOUNTER (EMERGENCY)
Facility: HOSPITAL | Age: 23
Discharge: HOME | End: 2023-02-28
Payer: MEDICAID

## 2023-02-28 VITALS
RESPIRATION RATE: 17 BRPM | WEIGHT: 130 LBS | TEMPERATURE: 98 F | OXYGEN SATURATION: 98 % | DIASTOLIC BLOOD PRESSURE: 67 MMHG | SYSTOLIC BLOOD PRESSURE: 115 MMHG | HEART RATE: 78 BPM | BODY MASS INDEX: 25.52 KG/M2 | HEIGHT: 60 IN

## 2023-02-28 DIAGNOSIS — R07.89 CHEST WALL PAIN: Primary | ICD-10-CM

## 2023-02-28 DIAGNOSIS — R07.9 CHEST PAIN: ICD-10-CM

## 2023-02-28 LAB — D DIMER PPP IA.FEU-MCNC: 0.44 MG/L (ref 0.19–0.5)

## 2023-02-28 PROCEDURE — 25000003 PHARM REV CODE 250

## 2023-02-28 PROCEDURE — 93005 ELECTROCARDIOGRAM TRACING: CPT

## 2023-02-28 PROCEDURE — 93010 ELECTROCARDIOGRAM REPORT: CPT | Mod: ,,, | Performed by: INTERNAL MEDICINE

## 2023-02-28 PROCEDURE — 85379 FIBRIN DEGRADATION QUANT: CPT

## 2023-02-28 PROCEDURE — 93010 EKG 12-LEAD: ICD-10-PCS | Mod: ,,, | Performed by: INTERNAL MEDICINE

## 2023-02-28 PROCEDURE — 36415 COLL VENOUS BLD VENIPUNCTURE: CPT

## 2023-02-28 RX ORDER — ACETAMINOPHEN 500 MG
1000 TABLET ORAL
Status: COMPLETED | OUTPATIENT
Start: 2023-02-28 | End: 2023-02-28

## 2023-02-28 RX ADMIN — ACETAMINOPHEN 1000 MG: 500 TABLET, FILM COATED ORAL at 01:02

## 2023-02-28 NOTE — ED PROVIDER NOTES
Encounter Date: 2/27/2023       History     Chief Complaint   Patient presents with    Chest Pain     Midsternal sharp pain x30 min     22-year-old female presents complaining of chest pain that began about 30 minutes prior to arrival.  Pain is in the center of her chest, centered over the xiphoid.  She has no shortness of breath.  She has been feeling well, and has not been ill.  She is on birth control.    The history is provided by the patient.   Review of patient's allergies indicates:  No Known Allergies  Past Medical History:   Diagnosis Date    Anemia     Heart murmur      No past surgical history on file.  Family History   Problem Relation Age of Onset    Hypertension Mother      Social History     Tobacco Use    Smoking status: Never    Smokeless tobacco: Never   Substance Use Topics    Alcohol use: Not Currently    Drug use: Not Currently     Review of Systems   Cardiovascular:  Positive for chest pain.   Psychiatric/Behavioral:  The patient is nervous/anxious.    All other systems reviewed and are negative.    Physical Exam     Initial Vitals [02/28/23 0009]   BP Pulse Resp Temp SpO2   137/76 96 17 98.3 °F (36.8 °C) 100 %      MAP       --         Physical Exam    Nursing note and vitals reviewed.  Constitutional: Vital signs are normal. She appears well-developed and well-nourished. She is cooperative.   HENT:   Head: Normocephalic and atraumatic.   Eyes: Conjunctivae, EOM and lids are normal. Pupils are equal, round, and reactive to light.   Neck: Trachea normal. Neck supple.   Normal range of motion.  Cardiovascular:  Normal rate, regular rhythm, normal heart sounds and intact distal pulses.           Pulmonary/Chest: Breath sounds normal. She exhibits tenderness.   She is moderately tender directly over the xiphoid.   Abdominal: Abdomen is soft. Bowel sounds are normal.   Musculoskeletal:         General: Normal range of motion.      Cervical back: Normal, normal range of motion and neck supple.       Lumbar back: Normal.     Neurological: She is alert and oriented to person, place, and time. She has normal strength. Coordination normal.   Skin: Skin is warm, dry and intact. Capillary refill takes less than 2 seconds.   Psychiatric: She has a normal mood and affect. Her speech is normal and behavior is normal. Judgment and thought content normal. Cognition and memory are normal.       ED Course   Procedures  Labs Reviewed   D DIMER, QUANTITATIVE - Normal        ECG Results              EKG 12-lead (Preliminary result)  Result time 02/28/23 01:00:21      Wet Read by Greg Oviedo MD (02/28/23 01:00:21, [facility])    Normal sinus rhythm, heart rate 84, normal intervals, normal axis, normal P waves, normal QRS, normal T waves, normal ST segments, normal EKG                                  Imaging Results              X-Ray Chest PA And Lateral (Preliminary result)  Result time 02/28/23 00:55:18      Wet Read by Greg Oviedo MD (02/28/23 00:55:18, [facility])    Cardiac silhouette, normal lung fields, normal chest x-ray                                     Medications   acetaminophen tablet 1,000 mg (1,000 mg Oral Given 2/28/23 0140)     Medical Decision Making:   Initial Assessment:   Chest pain with chest wall tenderness.  Normal EKG and chest x-ray.  On birth control.  Differential Diagnosis:   PE remains a possibility, since patient is on birth control, and PERC can not be used  ED Management:  Will order D-dimer.  D-dimer: normal                        Clinical Impression:   Final diagnoses:  [R07.9] Chest pain  [R07.89] Chest wall pain (Primary)        ED Disposition Condition    Discharge Good          ED Prescriptions    None       Follow-up Information       Follow up With Specialties Details Why Contact Info    Sally Nobles NP Family Medicine Call today  [address] [facility]  [phone]  [phone]               Greg Oviedo MD  02/28/23 0204

## 2023-08-07 ENCOUNTER — HOSPITAL ENCOUNTER (EMERGENCY)
Facility: HOSPITAL | Age: 23
Discharge: HOME | End: 2023-08-07
Payer: MEDICAID

## 2023-08-07 VITALS
HEIGHT: 60 IN | OXYGEN SATURATION: 98 % | BODY MASS INDEX: 30.63 KG/M2 | DIASTOLIC BLOOD PRESSURE: 69 MMHG | TEMPERATURE: 98 F | RESPIRATION RATE: 18 BRPM | HEART RATE: 70 BPM | SYSTOLIC BLOOD PRESSURE: 115 MMHG | WEIGHT: 156 LBS

## 2023-08-07 DIAGNOSIS — R42 VERTIGO: Primary | ICD-10-CM

## 2023-08-07 LAB
ALBUMIN SERPL-MCNC: 4.5 G/DL (ref 3.4–5)
ALBUMIN/GLOB SERPL: 1.4 RATIO
ALP SERPL-CCNC: 104 UNIT/L (ref 50–144)
ALT SERPL-CCNC: 22 UNIT/L (ref 1–45)
ANION GAP SERPL CALC-SCNC: 11 MEQ/L (ref 2–13)
AST SERPL-CCNC: 24 UNIT/L (ref 14–36)
BASOPHILS # BLD AUTO: 0.07 X10(3)/MCL (ref 0.01–0.08)
BASOPHILS NFR BLD AUTO: 0.6 % (ref 0.1–1.2)
BILIRUBIN DIRECT+TOT PNL SERPL-MCNC: 0.4 MG/DL (ref 0–1)
BUN SERPL-MCNC: 10 MG/DL (ref 7–20)
CALCIUM SERPL-MCNC: 9.1 MG/DL (ref 8.4–10.2)
CHLORIDE SERPL-SCNC: 107 MMOL/L (ref 98–110)
CO2 SERPL-SCNC: 23 MMOL/L (ref 21–32)
CREAT SERPL-MCNC: 0.6 MG/DL (ref 0.66–1.25)
CREAT/UREA NIT SERPL: 17 (ref 12–20)
EOSINOPHIL # BLD AUTO: 0.08 X10(3)/MCL (ref 0.04–0.36)
EOSINOPHIL NFR BLD AUTO: 0.6 % (ref 0.7–7)
ERYTHROCYTE [DISTWIDTH] IN BLOOD BY AUTOMATED COUNT: 13.9 % (ref 11–14.5)
GFR SERPLBLD CREATININE-BSD FMLA CKD-EPI: >90 MLS/MIN/1.73/M2
GLOBULIN SER-MCNC: 3.3 GM/DL (ref 2–3.9)
GLUCOSE SERPL-MCNC: 80 MG/DL (ref 70–115)
HCT VFR BLD AUTO: 38.1 % (ref 36–48)
HGB BLD-MCNC: 12 G/DL (ref 11.8–16)
IMM GRANULOCYTES # BLD AUTO: 0.04 X10(3)/MCL (ref 0–0.03)
IMM GRANULOCYTES NFR BLD AUTO: 0.3 % (ref 0–0.5)
LYMPHOCYTES # BLD AUTO: 2.67 X10(3)/MCL (ref 1.16–3.74)
LYMPHOCYTES NFR BLD AUTO: 21.4 % (ref 20–55)
MCH RBC QN AUTO: 24.1 PG (ref 27–34)
MCHC RBC AUTO-ENTMCNC: 31.5 G/DL (ref 31–37)
MCV RBC AUTO: 76.5 FL (ref 79–99)
MONOCYTES # BLD AUTO: 1.45 X10(3)/MCL (ref 0.24–0.36)
MONOCYTES NFR BLD AUTO: 11.6 % (ref 4.7–12.5)
NEUTROPHILS # BLD AUTO: 8.18 X10(3)/MCL (ref 1.56–6.13)
NEUTROPHILS NFR BLD AUTO: 65.5 % (ref 37–73)
NRBC BLD AUTO-RTO: 0 %
PLATELET # BLD AUTO: 289 X10(3)/MCL (ref 140–371)
PMV BLD AUTO: 10.1 FL (ref 9.4–12.4)
POTASSIUM SERPL-SCNC: 3.7 MMOL/L (ref 3.5–5.1)
PROT SERPL-MCNC: 7.8 GM/DL (ref 6.3–8.2)
RBC # BLD AUTO: 4.98 X10(6)/MCL (ref 4–5.1)
SODIUM SERPL-SCNC: 141 MMOL/L (ref 135–145)
WBC # SPEC AUTO: 12.49 X10(3)/MCL (ref 4–11.5)

## 2023-08-07 PROCEDURE — 85025 COMPLETE CBC W/AUTO DIFF WBC: CPT | Performed by: NURSE PRACTITIONER

## 2023-08-07 PROCEDURE — 80053 COMPREHEN METABOLIC PANEL: CPT | Performed by: NURSE PRACTITIONER

## 2023-08-07 PROCEDURE — 99283 EMERGENCY DEPT VISIT LOW MDM: CPT

## 2023-08-07 PROCEDURE — 36415 COLL VENOUS BLD VENIPUNCTURE: CPT | Performed by: NURSE PRACTITIONER

## 2023-08-07 RX ORDER — MECLIZINE HYDROCHLORIDE 25 MG/1
25 TABLET ORAL 3 TIMES DAILY PRN
Qty: 20 TABLET | Refills: 0 | Status: SHIPPED | OUTPATIENT
Start: 2023-08-07

## 2023-08-07 NOTE — Clinical Note
"Abhay Villagomezlisbet" Shakir was seen and treated in our emergency department on 8/7/2023.  She may return to work on 08/08/2023.       If you have any questions or concerns, please don't hesitate to call.      Libra Bright, MPP"

## 2023-08-07 NOTE — ED PROVIDER NOTES
Encounter Date: 8/7/2023       History     Chief Complaint   Patient presents with    Dizziness    hematoma    Shortness of Breath     Pt c/o intermittent dizziness, blurred vision, SOB, and abnormal bruising to bilateral lower extremities onset approx. 1 week.  Pt denies any symptoms at time of triage.       C/O· Dizziness after walking downstairs at work in the heat  · Bruise on lower legs, denies trauma  · Shortness of Breath after walking up and down stairs at work in the heat    Pt c/o intermittent dizziness, blurred vision, SOB, and abnormal bruising to bilateral lower extremities onset approx. 1 week.  Pt denies any symptoms at time of triage.          Review of patient's allergies indicates:  No Known Allergies  Past Medical History:   Diagnosis Date    Anemia     Heart murmur      No past surgical history on file.  Family History   Problem Relation Age of Onset    Hypertension Mother      Social History     Tobacco Use    Smoking status: Never    Smokeless tobacco: Never   Substance Use Topics    Alcohol use: Not Currently    Drug use: Not Currently     Review of Systems   Respiratory:  Positive for shortness of breath.    Skin:  Positive for color change.   Neurological:  Positive for dizziness.   All other systems reviewed and are negative.      Physical Exam     Initial Vitals [08/07/23 1559]   BP Pulse Resp Temp SpO2   (!) 147/101 93 18 98.3 °F (36.8 °C) 100 %      MAP       --         Physical Exam    Nursing note and vitals reviewed.  Constitutional: She appears well-developed and well-nourished.   HENT:   Head: Normocephalic and atraumatic.   Eyes: EOM are normal. Pupils are equal, round, and reactive to light.   Neck: Neck supple.   Normal range of motion.  Cardiovascular:  Normal rate, regular rhythm and normal heart sounds.           Pulmonary/Chest: Breath sounds normal.   Musculoskeletal:         General: Normal range of motion.      Cervical back: Normal range of motion and neck supple.      Neurological: She is alert and oriented to person, place, and time.   Skin: Skin is warm and dry. Capillary refill takes less than 2 seconds.   Psychiatric: She has a normal mood and affect. Her behavior is normal. Judgment and thought content normal.         ED Course   Procedures  Labs Reviewed   COMPREHENSIVE METABOLIC PANEL - Abnormal; Notable for the following components:       Result Value    Creatinine 0.60 (*)     All other components within normal limits   CBC WITH DIFFERENTIAL - Abnormal; Notable for the following components:    WBC 12.49 (*)     MCV 76.5 (*)     MCH 24.1 (*)     Eos % 0.6 (*)     Neut # 8.18 (*)     Mono # 1.45 (*)     IG# 0.04 (*)     All other components within normal limits   CBC W/ AUTO DIFFERENTIAL    Narrative:     The following orders were created for panel order CBC auto differential.  Procedure                               Abnormality         Status                     ---------                               -----------         ------                     CBC with Differential[[id number]]        Abnormal            Final result                 Please view results for these tests on the individual orders.          Imaging Results    None          Medications - No data to display                           Clinical Impression:   Final diagnoses:  [R42] Vertigo (Primary)        ED Disposition Condition    Discharge Stable          ED Prescriptions       Medication Sig Dispense Start Date End Date Auth. Provider    meclizine (ANTIVERT) 25 mg tablet Take 1 tablet (25 mg total) by mouth 3 (three) times daily as needed for Dizziness. 20 tablet 8/7/2023 -- Libra Bright FNP          Follow-up Information    None          Libra Bright FNP  08/07/23 1889

## 2023-09-11 ENCOUNTER — HOSPITAL ENCOUNTER (EMERGENCY)
Facility: HOSPITAL | Age: 23
Discharge: HOME | End: 2023-09-11
Payer: MEDICAID

## 2023-09-11 VITALS
RESPIRATION RATE: 18 BRPM | DIASTOLIC BLOOD PRESSURE: 71 MMHG | BODY MASS INDEX: 28.51 KG/M2 | OXYGEN SATURATION: 99 % | SYSTOLIC BLOOD PRESSURE: 116 MMHG | WEIGHT: 167 LBS | HEIGHT: 64 IN | TEMPERATURE: 99 F | HEART RATE: 90 BPM

## 2023-09-11 DIAGNOSIS — M79.605 LEFT LEG PAIN: ICD-10-CM

## 2023-09-11 LAB — B-HCG SERPL QL: NEGATIVE

## 2023-09-11 PROCEDURE — 81025 URINE PREGNANCY TEST: CPT | Performed by: EMERGENCY MEDICINE

## 2023-09-11 PROCEDURE — 96372 THER/PROPH/DIAG INJ SC/IM: CPT | Performed by: EMERGENCY MEDICINE

## 2023-09-11 PROCEDURE — 99285 EMERGENCY DEPT VISIT HI MDM: CPT | Mod: 25

## 2023-09-11 PROCEDURE — 63600175 PHARM REV CODE 636 W HCPCS: Performed by: EMERGENCY MEDICINE

## 2023-09-11 RX ORDER — MELOXICAM 15 MG/1
15 TABLET ORAL DAILY PRN
Qty: 20 TABLET | Refills: 0 | Status: SHIPPED | OUTPATIENT
Start: 2023-09-11

## 2023-09-11 RX ORDER — HYDROMORPHONE HYDROCHLORIDE 1 MG/ML
1 INJECTION, SOLUTION INTRAMUSCULAR; INTRAVENOUS; SUBCUTANEOUS
Status: COMPLETED | OUTPATIENT
Start: 2023-09-11 | End: 2023-09-11

## 2023-09-11 RX ORDER — HYDROCODONE BITARTRATE AND ACETAMINOPHEN 5; 325 MG/1; MG/1
1 TABLET ORAL EVERY 6 HOURS PRN
Qty: 5 TABLET | Refills: 0 | Status: SHIPPED | OUTPATIENT
Start: 2023-09-11

## 2023-09-11 RX ADMIN — HYDROMORPHONE HYDROCHLORIDE 1 MG: 1 INJECTION, SOLUTION INTRAMUSCULAR; INTRAVENOUS; SUBCUTANEOUS at 09:09

## 2023-09-11 NOTE — Clinical Note
"Abhay Schilling (Jadalen) was seen and treated in our emergency department on 9/11/2023.  She may return to work on 09/13/2023.       If you have any questions or concerns, please don't hesitate to call.       RN    "

## 2023-09-12 NOTE — ED PROVIDER NOTES
Encounter Date: 9/11/2023       History     Chief Complaint   Patient presents with    Leg Pain     Patient complains of non-traumatic left leg pain      23-year-old female who presents today with complaints of left lower extremity pain x1 day.  She denies any trauma to it.  She states the entire leg is hurting.  She does have some associated left lower back pain.  She reports extreme pain with any movement.  Denies any swelling, erythema.  Denies any similar prior episodes.  She came via the ambulance.  Denies fever, bowel/bladder incontinence/retention      Review of patient's allergies indicates:  No Known Allergies  Past Medical History:   Diagnosis Date    Anemia     Heart murmur      No past surgical history on file.  Family History   Problem Relation Age of Onset    Hypertension Mother      Social History     Tobacco Use    Smoking status: Never    Smokeless tobacco: Never   Substance Use Topics    Alcohol use: Not Currently    Drug use: Not Currently     Review of Systems    See HPI. Otherwise Review of Systems negative.      Physical Exam     Initial Vitals [09/11/23 1929]   BP Pulse Resp Temp SpO2   133/86 104 19 97.8 °F (36.6 °C) 98 %      MAP       --         Physical Exam    Constitutional: Awake, alert, in severe pain with any movement  HENT: normocephalic, atraumatic  Eyes: PERRL, EOM, normal conjunctiva  Neck: Trachea midline,  full ROM  Cardiovascular: RRR, 2+ palpable pulses in all 4 extremities  Pulmonary: Non-labored respirations  Abdominal: nondistended  Back: left lower ttp near SI joint.  No midline spinal tenderness  Musculoskeletal:  Not wanting to move that left lower extremity due to pain.  Allodynia.  2+ distal pulses.  No edema.  No unilateral leg width discrepancy  Neurological: AAO x4, GCS 15, maintaining airway and answering questions appropriately, no focal deficits  Skin:  No Erythema      ED Course   Procedures  Labs Reviewed   HCG QUALITATIVE URINE - Normal     Results for orders  placed or performed during the hospital encounter of 09/11/23   HCG Qualitative Urine   Result Value Ref Range    Beta hCG Qualitative, Urine Negative Negative            Imaging Results              US Lower Extremity Veins Left (In process)                      Medications   HYDROmorphone injection 1 mg (1 mg Intramuscular Given 9/11/23 2129)     Medical Decision Making  23-year-old female presenting with low back pain and pain involving the entire left lower extremity.  She has 2+ distal pulses.  No edema or erythema.  Ultrasound negative for DVT.  Given the severity of pain patient is reporting, patient was treated with 1 dose of Dilaudid in the emergency department with positive response.  Sent home with a prescription of meloxicam and 5 hydrocodone-5's for breakthrough pain relief.  Discussed activity modification.  Work excuse provided outpatient follow-up advised.  ER return precautions provided for any new worsening symptoms    Amount and/or Complexity of Data Reviewed  Labs: ordered. Decision-making details documented in ED Course.  Radiology: ordered. Decision-making details documented in ED Course.    Risk  OTC drugs.  Prescription drug management.  Parenteral controlled substances.               ED Course as of 09/11/23 2315   Mon Sep 11, 2023   2218 US verbal report per tech is negative [DP]      ED Course User Index  [DP] Kaleb Vasquez MD                    Clinical Impression:   Final diagnoses:  [M79.605] Left leg pain        ED Disposition Condition    Discharge Stable          ED Prescriptions       Medication Sig Dispense Start Date End Date Auth. Provider    meloxicam (MOBIC) 15 MG tablet Take 1 tablet (15 mg total) by mouth daily as needed for Pain. 20 tablet 9/11/2023 -- Kaleb Vasquez MD    HYDROcodone-acetaminophen (NORCO) 5-325 mg per tablet Take 1 tablet by mouth every 6 (six) hours as needed for Pain. 5 tablet 9/11/2023 -- Kaleb Vasquez MD          Follow-up Information       Follow  up With Specialties Details Why Contact Info    Sally Nobles, NP Family Medicine Call   [address] [facility] [id number] [phone]      [facility]  As needed, If symptoms worsen [id number]  [facility] [address] [phone]             Kaleb Vasquez MD  09/11/23 3913

## 2023-12-03 ENCOUNTER — HOSPITAL ENCOUNTER (EMERGENCY)
Facility: HOSPITAL | Age: 23
Discharge: HOME | End: 2023-12-03
Attending: FAMILY MEDICINE
Payer: COMMERCIAL

## 2023-12-03 VITALS
HEART RATE: 85 BPM | HEIGHT: 60 IN | BODY MASS INDEX: 31.48 KG/M2 | RESPIRATION RATE: 18 BRPM | TEMPERATURE: 99 F | SYSTOLIC BLOOD PRESSURE: 135 MMHG | DIASTOLIC BLOOD PRESSURE: 85 MMHG | OXYGEN SATURATION: 100 % | WEIGHT: 160.31 LBS

## 2023-12-03 DIAGNOSIS — G44.209 TENSION HEADACHE: ICD-10-CM

## 2023-12-03 DIAGNOSIS — J01.11 ACUTE RECURRENT FRONTAL SINUSITIS: Primary | ICD-10-CM

## 2023-12-03 LAB
APPEARANCE UR: CLEAR
B-HCG SERPL QL: NEGATIVE
BILIRUB UR QL STRIP.AUTO: NEGATIVE
COLOR UR AUTO: YELLOW
GLUCOSE UR QL STRIP.AUTO: NEGATIVE
INFLUENZA A (OHS): NEGATIVE
INFLUENZA B (OHS): NEGATIVE
KETONES UR QL STRIP.AUTO: ABNORMAL
LEUKOCYTE ESTERASE UR QL STRIP.AUTO: NEGATIVE
NITRITE UR QL STRIP.AUTO: NEGATIVE
PH UR STRIP.AUTO: 6 [PH]
PROT UR QL STRIP.AUTO: NEGATIVE
RAPID GROUP A STREP (OHS): NEGATIVE
RBC UR QL AUTO: NEGATIVE
SARS-COV-2 RDRP RESP QL NAA+PROBE: NEGATIVE
SP GR UR STRIP.AUTO: >=1.03 (ref 1–1.03)
UROBILINOGEN UR STRIP-ACNC: 0.2

## 2023-12-03 PROCEDURE — 87651 STREP A DNA AMP PROBE: CPT | Performed by: FAMILY MEDICINE

## 2023-12-03 PROCEDURE — 81025 URINE PREGNANCY TEST: CPT | Performed by: FAMILY MEDICINE

## 2023-12-03 PROCEDURE — 81003 URINALYSIS AUTO W/O SCOPE: CPT | Performed by: FAMILY MEDICINE

## 2023-12-03 PROCEDURE — 99284 EMERGENCY DEPT VISIT MOD MDM: CPT

## 2023-12-03 PROCEDURE — 87400 INFLUENZA A/B EACH AG IA: CPT | Performed by: FAMILY MEDICINE

## 2023-12-03 PROCEDURE — 25000003 PHARM REV CODE 250: Performed by: FAMILY MEDICINE

## 2023-12-03 PROCEDURE — 96372 THER/PROPH/DIAG INJ SC/IM: CPT | Performed by: FAMILY MEDICINE

## 2023-12-03 PROCEDURE — 63600175 PHARM REV CODE 636 W HCPCS: Performed by: FAMILY MEDICINE

## 2023-12-03 PROCEDURE — 87635 SARS-COV-2 COVID-19 AMP PRB: CPT | Performed by: FAMILY MEDICINE

## 2023-12-03 RX ORDER — LEVOFLOXACIN 500 MG/1
500 TABLET, FILM COATED ORAL DAILY
Status: COMPLETED | OUTPATIENT
Start: 2023-12-03 | End: 2023-12-03

## 2023-12-03 RX ORDER — KETOROLAC TROMETHAMINE 30 MG/ML
60 INJECTION, SOLUTION INTRAMUSCULAR; INTRAVENOUS
Status: COMPLETED | OUTPATIENT
Start: 2023-12-03 | End: 2023-12-03

## 2023-12-03 RX ORDER — LEVOFLOXACIN 500 MG/1
500 TABLET, FILM COATED ORAL DAILY
Qty: 10 TABLET | Refills: 0 | Status: SHIPPED | OUTPATIENT
Start: 2023-12-03 | End: 2023-12-13

## 2023-12-03 RX ORDER — ACETAMINOPHEN 500 MG
1000 TABLET ORAL
Status: COMPLETED | OUTPATIENT
Start: 2023-12-03 | End: 2023-12-03

## 2023-12-03 RX ADMIN — KETOROLAC TROMETHAMINE 60 MG: 30 INJECTION, SOLUTION INTRAMUSCULAR; INTRAVENOUS at 10:12

## 2023-12-03 RX ADMIN — LEVOFLOXACIN 500 MG: 500 TABLET, FILM COATED ORAL at 11:12

## 2023-12-03 RX ADMIN — ACETAMINOPHEN 1000 MG: 500 TABLET, FILM COATED ORAL at 10:12

## 2023-12-03 NOTE — Clinical Note
"Abhay Sanchez" Shakir was seen and treated in our emergency department on 12/3/2023.  She may return to work on 12/03/2023.       If you have any questions or concerns, please don't hesitate to call.      dr skelton/hawk rn RN    "

## 2023-12-04 NOTE — ED PROVIDER NOTES
Encounter Date: 12/3/2023       History     Chief Complaint   Patient presents with    Headache     Pt. Reports Generalized HA. With associated nausea and cough. Onset 4am today. Denies vomiting, Fever, Dysuria.     Cough     Patient presents for evaluation of cough and congestion with headache. Patient notes having moderate aching pain that's constant in her frontal area. Pain is associated with cough and sinus pain and discharge for the past 14 days. Patient denies having any other associated symptoms at present.    The history is provided by the patient.     Review of patient's allergies indicates:  No Known Allergies  Past Medical History:   Diagnosis Date    Anemia     Heart murmur      No past surgical history on file.  Family History   Problem Relation Age of Onset    Hypertension Mother      Social History     Tobacco Use    Smoking status: Never    Smokeless tobacco: Never   Substance Use Topics    Alcohol use: Not Currently    Drug use: Not Currently     Review of Systems   Constitutional:  Positive for fatigue.   HENT:  Positive for sinus pressure and sinus pain.    Eyes: Negative.    Respiratory:  Positive for cough.    Cardiovascular: Negative.    Gastrointestinal: Negative.    Endocrine: Negative.    Genitourinary: Negative.    Musculoskeletal: Negative.    Skin: Negative.    Allergic/Immunologic: Negative.    Neurological:  Positive for headaches.   Psychiatric/Behavioral: Negative.         Physical Exam     Initial Vitals   BP Pulse Resp Temp SpO2   12/03/23 2113 12/03/23 2112 12/03/23 2112 12/03/23 2112 12/03/23 2113   132/78 95 (!) 95 98.6 °F (37 °C) 99 %      MAP       --                Physical Exam    Vitals reviewed.  Constitutional: She appears well-developed.   HENT:   Head: Normocephalic and atraumatic.   Eyes: EOM are normal. Pupils are equal, round, and reactive to light.   Neck: Neck supple.   Normal range of motion.  Cardiovascular:  Normal rate, regular rhythm and normal heart sounds.            Pulmonary/Chest: Breath sounds normal.   Abdominal: Abdomen is soft. Bowel sounds are normal.   Musculoskeletal:         General: Normal range of motion.      Cervical back: Normal range of motion and neck supple.     Neurological: She is alert and oriented to person, place, and time. She has normal reflexes. GCS score is 15. GCS eye subscore is 4. GCS verbal subscore is 5. GCS motor subscore is 6.   Skin: Skin is warm.   Psychiatric: She has a normal mood and affect.         ED Course   Procedures  Labs Reviewed   URINALYSIS - Abnormal; Notable for the following components:       Result Value    Ketones, UA Trace (*)     All other components within normal limits    Narrative:      URINE STABILITY IS 2 HOURS AT ROOM TEMP OR    SIX HOURS REFRIGERATED. PERFORMING TESTING ON    SPECIMENS GREATER THAN THIS AGE MAY AFFECT THE    FOLLOWING TESTS:    PH          SPECIFIC GRAVITY           BLOOD    CLARITY     BILIRUBIN               UROBILINOGEN   RAPID INFLUENZA A/B - Normal   THROAT SCREEN, RAPID STREP - Normal   SARS-COV-2 RNA AMPLIFICATION, QUAL - Normal   HCG QUALITATIVE URINE - Normal          Imaging Results    None          Medications   levoFLOXacin tablet 500 mg (has no administration in time range)   ketorolac injection 60 mg (60 mg Intramuscular Given 12/3/23 2245)   acetaminophen tablet 1,000 mg (1,000 mg Oral Given 12/3/23 2245)     Medical Decision Making  Amount and/or Complexity of Data Reviewed  Labs: ordered.    Risk  OTC drugs.  Prescription drug management.                                      Clinical Impression:  Final diagnoses:  [J01.11] Acute recurrent frontal sinusitis (Primary)  [G44.209] Tension headache          ED Disposition Condition    Discharge Stable          ED Prescriptions       Medication Sig Dispense Start Date End Date Auth. Provider    levoFLOXacin (LEVAQUIN) 500 MG tablet Take 1 tablet (500 mg total) by mouth once daily. for 10 days 10 tablet 12/3/2023 12/13/2023 Regan  Leroy CABA MD          Follow-up Information    None          Leroy Spears MD  12/03/23 6821

## 2024-03-06 ENCOUNTER — HOSPITAL ENCOUNTER (EMERGENCY)
Facility: HOSPITAL | Age: 24
Discharge: HOME | End: 2024-03-06
Payer: COMMERCIAL

## 2024-03-06 VITALS
OXYGEN SATURATION: 100 % | DIASTOLIC BLOOD PRESSURE: 81 MMHG | TEMPERATURE: 98 F | RESPIRATION RATE: 18 BRPM | HEART RATE: 92 BPM | BODY MASS INDEX: 32.39 KG/M2 | HEIGHT: 60 IN | SYSTOLIC BLOOD PRESSURE: 134 MMHG | WEIGHT: 165 LBS

## 2024-03-06 DIAGNOSIS — T78.40XA ALLERGIC REACTION, INITIAL ENCOUNTER: ICD-10-CM

## 2024-03-06 DIAGNOSIS — L50.9 URTICARIA: Primary | ICD-10-CM

## 2024-03-06 PROCEDURE — 99283 EMERGENCY DEPT VISIT LOW MDM: CPT

## 2024-03-06 RX ORDER — HYDROXYZINE HYDROCHLORIDE 25 MG/1
25 TABLET, FILM COATED ORAL EVERY 6 HOURS
Qty: 12 TABLET | Refills: 0 | Status: SHIPPED | OUTPATIENT
Start: 2024-03-06 | End: 2024-05-20

## 2024-03-06 NOTE — Clinical Note
"Abhay Villagomezlisbet" Shakir was seen and treated in our emergency department on 3/6/2024.  She may return to work on 03/07/2024.       If you have any questions or concerns, please don't hesitate to call.      Libra Bright, MPP"

## 2024-03-06 NOTE — ED PROVIDER NOTES
"Encounter Date: 3/6/2024       History     Chief Complaint   Patient presents with    Allergic Reaction     Reports that she got a steroid injection with dr camp on Monday in her lower back and has been itching every since. Took benadryl at 1130 this morning and the itching is not going away. Not obvious whelps or rash noted to the skin. States "it feels like my insides itch" also c/o burning in throat. No tracheal deviation noted. No throat edema or erythema noted.      C/o itching all over after a steroid injection with dr camp on Monday in her lower back and has been itching every since. Took benadryl at 1130 this morning and the itching is not going away. Not obvious whelps or rash noted to the skin. States "it feels like my insides itch" also c/o burning in throat. No tracheal deviation noted. No throat edema or erythema noted.            Review of patient's allergies indicates:  No Known Allergies  Past Medical History:   Diagnosis Date    Anemia     Anxiety disorder, unspecified     Heart murmur      No past surgical history on file.  Family History   Problem Relation Age of Onset    Hypertension Mother      Social History     Tobacco Use    Smoking status: Never    Smokeless tobacco: Never   Substance Use Topics    Alcohol use: Not Currently    Drug use: Not Currently     Review of Systems   Skin:         Itching all over   All other systems reviewed and are negative.      Physical Exam     Initial Vitals [03/06/24 1624]   BP Pulse Resp Temp SpO2   134/81 92 18 98.2 °F (36.8 °C) 100 %      MAP       --         Physical Exam    Nursing note and vitals reviewed.  Constitutional: She appears well-developed and well-nourished.   HENT:   Head: Normocephalic and atraumatic.   Eyes: Pupils are equal, round, and reactive to light.   Neck:   Normal range of motion.  Cardiovascular:  Normal rate.           Pulmonary/Chest: No respiratory distress.   Musculoskeletal:         General: Normal range of motion. "      Cervical back: Normal range of motion.     Neurological: She is alert and oriented to person, place, and time.   Skin: Skin is warm and dry. Capillary refill takes less than 2 seconds.   Psychiatric: She has a normal mood and affect. Her behavior is normal. Judgment and thought content normal.         ED Course   Procedures  Labs Reviewed - No data to display       Imaging Results    None          Medications - No data to display  Medical Decision Making  Problems Addressed:  Allergic reaction, initial encounter: acute illness or injury  Urticaria: acute illness or injury                                      Clinical Impression:  Final diagnoses:  [L50.9] Urticaria (Primary)  [T78.40XA] Allergic reaction, initial encounter          ED Disposition Condition    Discharge Stable          ED Prescriptions       Medication Sig Dispense Start Date End Date Auth. Provider    hydrOXYzine HCL (ATARAX) 25 MG tablet Take 1 tablet (25 mg total) by mouth every 6 (six) hours. 12 tablet 3/6/2024 -- Libra Bright FNP          Follow-up Information       Follow up With Specialties Details Why Contact Info    Sally Nobles NP Family Medicine Call  As needed [address] [facility] [address] [phone]               Libra Bright FNP  03/06/24 6331

## 2024-04-09 ENCOUNTER — OFFICE VISIT (OUTPATIENT)
Dept: OBSTETRICS AND GYNECOLOGY | Facility: CLINIC | Age: 24
End: 2024-04-09
Payer: COMMERCIAL

## 2024-04-09 VITALS
SYSTOLIC BLOOD PRESSURE: 110 MMHG | HEIGHT: 60 IN | BODY MASS INDEX: 31.64 KG/M2 | DIASTOLIC BLOOD PRESSURE: 72 MMHG | WEIGHT: 161.19 LBS

## 2024-04-09 DIAGNOSIS — N92.1 MENOMETRORRHAGIA: ICD-10-CM

## 2024-04-09 DIAGNOSIS — N94.6 DYSMENORRHEA: ICD-10-CM

## 2024-04-09 DIAGNOSIS — Z12.4 CERVICAL CANCER SCREENING: ICD-10-CM

## 2024-04-09 DIAGNOSIS — Z01.419 WELL WOMAN EXAM WITH ROUTINE GYNECOLOGICAL EXAM: Primary | ICD-10-CM

## 2024-04-09 DIAGNOSIS — Z11.3 SCREENING EXAMINATION FOR STD (SEXUALLY TRANSMITTED DISEASE): ICD-10-CM

## 2024-04-09 LAB
B-HCG UR QL: NEGATIVE
CTP QC/QA: YES

## 2024-04-09 PROCEDURE — 87591 N.GONORRHOEAE DNA AMP PROB: CPT

## 2024-04-09 PROCEDURE — 1160F RVW MEDS BY RX/DR IN RCRD: CPT | Mod: CPTII,,,

## 2024-04-09 PROCEDURE — 3078F DIAST BP <80 MM HG: CPT | Mod: CPTII,,,

## 2024-04-09 PROCEDURE — 87491 CHLMYD TRACH DNA AMP PROBE: CPT

## 2024-04-09 PROCEDURE — 3074F SYST BP LT 130 MM HG: CPT | Mod: CPTII,,,

## 2024-04-09 PROCEDURE — 87661 TRICHOMONAS VAGINALIS AMPLIF: CPT

## 2024-04-09 PROCEDURE — 3008F BODY MASS INDEX DOCD: CPT | Mod: CPTII,,,

## 2024-04-09 PROCEDURE — 81025 URINE PREGNANCY TEST: CPT | Mod: ,,,

## 2024-04-09 PROCEDURE — 1159F MED LIST DOCD IN RCRD: CPT | Mod: CPTII,,,

## 2024-04-09 PROCEDURE — 99385 PREV VISIT NEW AGE 18-39: CPT | Mod: ,,,

## 2024-04-09 RX ORDER — TRANEXAMIC ACID 650 MG/1
1300 TABLET ORAL 3 TIMES DAILY
Qty: 30 TABLET | Refills: 6 | Status: SHIPPED | OUTPATIENT
Start: 2024-04-09 | End: 2024-05-14

## 2024-04-12 LAB
CHLAMYDIA TRACHOMATIS: NEGATIVE
NEISSERIA GONORRHOEAE: NEGATIVE
PSYCHE PATHOLOGY RESULT: NORMAL
TRICHOMONAS VAGINALIS: NEGATIVE

## 2024-04-15 ENCOUNTER — TELEPHONE (OUTPATIENT)
Dept: OBSTETRICS AND GYNECOLOGY | Facility: CLINIC | Age: 24
End: 2024-04-15
Payer: COMMERCIAL

## 2024-04-15 DIAGNOSIS — B96.89 PELVIC INFLAMMATORY DISEASE DUE TO MYCOPLASMA HOMINIS: ICD-10-CM

## 2024-04-15 DIAGNOSIS — N76.0 BV (BACTERIAL VAGINOSIS): Primary | ICD-10-CM

## 2024-04-15 DIAGNOSIS — B37.9 YEAST INFECTION: ICD-10-CM

## 2024-04-15 DIAGNOSIS — N73.9 PELVIC INFLAMMATORY DISEASE DUE TO MYCOPLASMA HOMINIS: ICD-10-CM

## 2024-04-15 DIAGNOSIS — B96.89 BV (BACTERIAL VAGINOSIS): Primary | ICD-10-CM

## 2024-04-15 RX ORDER — DOXYCYCLINE 100 MG/1
100 CAPSULE ORAL 2 TIMES DAILY
Qty: 14 CAPSULE | Refills: 0 | Status: SHIPPED | OUTPATIENT
Start: 2024-04-15 | End: 2024-04-22

## 2024-04-15 RX ORDER — METRONIDAZOLE 500 MG/1
500 TABLET ORAL 2 TIMES DAILY
Qty: 14 TABLET | Refills: 0 | Status: SHIPPED | OUTPATIENT
Start: 2024-04-15 | End: 2024-04-22

## 2024-04-15 RX ORDER — FLUCONAZOLE 150 MG/1
150 TABLET ORAL
Qty: 2 TABLET | Refills: 0 | Status: SHIPPED | OUTPATIENT
Start: 2024-04-15 | End: 2024-04-19

## 2024-04-15 RX ORDER — MOXIFLOXACIN HYDROCHLORIDE 400 MG/1
400 TABLET ORAL DAILY
Qty: 7 TABLET | Refills: 0 | Status: SHIPPED | OUTPATIENT
Start: 2024-04-15 | End: 2024-04-22

## 2024-04-15 NOTE — TELEPHONE ENCOUNTER
Spoke with pt and informed of oneswab results of +BV and +MH and per Radha Flagyl for +BV and Doxy and Moxi for +MH and also Diflucan for possible yeast infection due to antibiotics being taken. I also informed pt that Radha recommends a daily probiotic and that a ELVIA needs to be scheduled for +MH. Pt verbalized understanding and was transferred to [facility] to schedule ELVIA.

## 2024-04-15 NOTE — TELEPHONE ENCOUNTER
----- Message from GEORGIA Roy sent at 4/15/2024  1:17 PM CDT -----  Please notify and treat for positive results on one swab:   BV: Flagyl 500mg BID x 7 days  MH: Doxy 100mg BID x7 days then Moxi 400mg daily x7 days.    She will need a ELVIA for MH in 4 weeks.  She can have Diflucan 150mg q72hrs x2 doses if needed for prevention on yeast infection.  Recommend daily probiotic  Thanks!

## 2024-05-20 ENCOUNTER — OFFICE VISIT (OUTPATIENT)
Dept: OBSTETRICS AND GYNECOLOGY | Facility: CLINIC | Age: 24
End: 2024-05-20
Payer: COMMERCIAL

## 2024-05-20 VITALS
HEIGHT: 60 IN | SYSTOLIC BLOOD PRESSURE: 126 MMHG | WEIGHT: 161.63 LBS | BODY MASS INDEX: 31.73 KG/M2 | DIASTOLIC BLOOD PRESSURE: 88 MMHG | TEMPERATURE: 98 F

## 2024-05-20 DIAGNOSIS — N92.6 IRREGULAR BLEEDING: ICD-10-CM

## 2024-05-20 DIAGNOSIS — Z30.013 INITIATION OF DEPO PROVERA: ICD-10-CM

## 2024-05-20 DIAGNOSIS — A49.3 MYCOPLASMA INFECTION: Primary | ICD-10-CM

## 2024-05-20 DIAGNOSIS — Z30.013 ENCOUNTER FOR INITIAL PRESCRIPTION OF INJECTABLE CONTRACEPTIVE: ICD-10-CM

## 2024-05-20 LAB
B-HCG UR QL: NEGATIVE
CTP QC/QA: YES

## 2024-05-20 PROCEDURE — 1160F RVW MEDS BY RX/DR IN RCRD: CPT | Mod: CPTII,,,

## 2024-05-20 PROCEDURE — 99213 OFFICE O/P EST LOW 20 MIN: CPT | Mod: ,,,

## 2024-05-20 PROCEDURE — 3008F BODY MASS INDEX DOCD: CPT | Mod: CPTII,,,

## 2024-05-20 PROCEDURE — 3079F DIAST BP 80-89 MM HG: CPT | Mod: CPTII,,,

## 2024-05-20 PROCEDURE — 81025 URINE PREGNANCY TEST: CPT | Mod: ,,,

## 2024-05-20 PROCEDURE — 3074F SYST BP LT 130 MM HG: CPT | Mod: CPTII,,,

## 2024-05-20 PROCEDURE — 1159F MED LIST DOCD IN RCRD: CPT | Mod: CPTII,,,

## 2024-05-20 RX ORDER — DOXYCYCLINE 100 MG/1
100 CAPSULE ORAL 2 TIMES DAILY
Qty: 14 CAPSULE | Refills: 0 | Status: SHIPPED | OUTPATIENT
Start: 2024-05-20 | End: 2024-05-27

## 2024-05-20 RX ORDER — MEDROXYPROGESTERONE ACETATE 150 MG/ML
150 INJECTION, SUSPENSION INTRAMUSCULAR
Status: COMPLETED | OUTPATIENT
Start: 2024-05-20 | End: 2024-05-20

## 2024-05-20 RX ADMIN — MEDROXYPROGESTERONE ACETATE 150 MG: 150 INJECTION, SUSPENSION INTRAMUSCULAR at 04:05

## 2024-05-20 NOTE — PROGRESS NOTES
Chief Complaint:  ELVIA (+MH, treated 4/15/24, pt states she did not receive Moxi. Pt wants to discuss birth control, depo. )    History of Present Illness:  Abhay Schilling is a 23 y.o.  who presents for a test of cure due to a previous diagnosis of MH. She completed her antibiotics without problems but states her pharmacy was out of stock for the moxi so she was never treated completely. States cycle is still irregular and she is currently bleeding after having cycle two weeks ago. She denies vaginal itching, odor, or discharge. Pt would like to begin DMPA today for contraception and bleeding control. UPT negative.    Review of Systems:  General/Constitutional: Chills denies. Fatigue/weakness denies. Fever denies. Night sweats denies. Hot flashes denies  Gastrointestinal: Abdominal pain denies. Blood in stool denies. Constipation denies. Diarrhea denies. Heartburn denies. Nausea denies. Vomiting denies   Genitourinary: Incontinence denies. Blood in urine denies. Frequent urination denies. Urgency denies. Painful urination denies. Nocturia denies   Gynecologic: Irregular menses admits. Heavy bleeding denies. Painful menses denies. Vaginal discharge denies. Vaginal odor denies. Vaginal itching/Irritation denies. Vaginal lesion denies.  Pelvic pain denies. Decreased libido denies. Vulvar lesion denies. Prolapse of genital organs denies. Painful intercourse denies. Postcoital bleeding denies     OB History    Para Term  AB Living   1 1 0 1 0 1   SAB IAB Ectopic Multiple Live Births   0 0 0 0 1      # 1 - Date: 22, Sex: Male, Weight: 2.551 kg (5 lb 10 oz), GA: 36w0d, Type: , Low Transverse, Apgar1: None, Apgar5: None, Living: Living, Birth Comments: None      Past Medical History:   Diagnosis Date    Anemia     Anxiety disorder, unspecified     Heart murmur        Current Outpatient Medications:     gabapentin (NEURONTIN) 300 MG capsule, Take 300 mg by mouth 4 (four) times daily.,  Disp: , Rfl:     Current Facility-Administered Medications:     medroxyPROGESTERone (DEPO-PROVERA) injection 150 mg, 150 mg, Intramuscular, 1 time in Clinic/HOD, Radha Sloan WHNP      Physical Exam:  /88   Temp 98.2 °F (36.8 °C)   Ht 5' (1.524 m)   Wt 73.3 kg (161 lb 9.6 oz)   LMP 05/20/2024   BMI 31.56 kg/m²     Chaperone present.       Constitutional: General appearance: healthy, well-nourished and well-developed   Psychiatric: Orientation to time, place and person. Normal mood and affect and active, alert   Abdomen: Auscultation/Inspection/Palpation: No tenderness or masses. Soft, nondistended    Female Genitalia:      Vulva: no masses, atrophy or lesions      Bladder/Urethra: no urethral discharge or mass, normal meatus, bladder non-distended.      Vagina: no tenderness, erythema, cystocele, rectocele, abnormal vaginal discharge, or vesicle(s) or ulcers, +bleeding                    Assessment/Plan:  1. Mycoplasma infection  -     MDL Sendout Test    2. Irregular bleeding    3. Encounter for initial prescription of injectable contraceptive  -     POCT Urine Pregnancy  -     medroxyPROGESTERone (DEPO-PROVERA) injection 150 mg    4. Initiation of Depo Provera  -     medroxyPROGESTERone (DEPO-PROVERA) injection 150 mg      Will treat pt for an additional week with Doxy d/t her pharmacy not having Moxifloxacin in stock.   Educated  Safe sex education  STI information     Pelvic rest    Patient desires depo-provera for contraceptive management  Patient tolerated injection  Bleeding precautions    Discussed that birth control does not protect against STDs  Follow up in 3mos for DMPA and for ELVIA if MH is still positive.

## 2024-05-20 NOTE — PROGRESS NOTES
Chief Complaint:  Well Woman (C/o heavy cycles with cramping. Requesting birth control. UPT negative today. )    History of Present Illness:  Abhay Schilling is a 23 y.o. year old  presents for her well woman exam and establishment of care. Currently relying on nothing for birth control. Having irregular monthly cycles lasting 7-14 days with heavy bleeding and moderate dysmenorrhea. Pt would like to discuss contraception for cycle control. UPT negative.     Gyn History:   LMP: 2024  Frequency: irregular   Cycle length: 7-14 days  Flow: heavy  Intermenstrual bleeding: Yes  Postcoital bleeding: Yes  Dysmenorrhea: Yes  Sexually active: Yes  Dyspareunia: Yes  Contraception: None  H/o STI: No   Last pap:24 WNL   H/o abnl pap: No  Colposcopy: N/a  Gardasil: 0  MMG: N/a  H/o abnl MMG: N/a  Colonoscopy: N/a       Review of Systems:  General/Constitutional: Chills denies. Fatigue/weakness denies. Fever denies. Night sweats denies. Hot flashes denies    Respiratory: Cough denies. Hemoptysis denies. SOB denies. Sputum production denies. Wheezing denies .   Cardiovascular: Chest pain denies. Dizziness denies. Palpitations denies. Swelling in hands/feet denies.                Breast: Dimpling denies. Breast mass denies. Breast pain/tenderness denies. Nipple discharge denies. Puckering denies.  Gastrointestinal: Abdominal pain denies. Blood in stool denies. Constipation denies. Diarrhea denies. Heartburn denies. Nausea denies. Vomiting denies    Genitourinary: Incontinence denies. Blood in urine denies. Frequent urination denies. Painful urination denies. Urinary urgency denies. Nocturia denies    Gynecologic: Irregular menses admits. Heavy bleeding admits. Painful menses admits. Vaginal discharge denies. Vaginal odor denies. Vaginal itching denies. Vaginal lesion denies. Pelvic pain denies. Decreased libido denies. Vulvar lesion denies. Prolapse of genital organs denies. Painful intercourse denies. Postcoital  bleeding denies    Psychiatric: Depression denies. Anxiety denies.     OB History    Para Term  AB Living   1 1 0 1 0 1   SAB IAB Ectopic Multiple Live Births   0 0 0 0 1      # 1 - Date: 22, Sex: Male, Weight: 2.551 kg (5 lb 10 oz), GA: 36w0d, Type: , Low Transverse, Apgar1: None, Apgar5: None, Living: Living, Birth Comments: None      Past Medical History:   Diagnosis Date    Anemia     Anxiety disorder, unspecified     Heart murmur        Current Outpatient Medications:     doxycycline (VIBRAMYCIN) 100 MG Cap, Take 1 capsule (100 mg total) by mouth 2 (two) times a day. for 7 days, Disp: 14 capsule, Rfl: 0    gabapentin (NEURONTIN) 300 MG capsule, Take 300 mg by mouth 4 (four) times daily., Disp: , Rfl:     Current Facility-Administered Medications:     medroxyPROGESTERone (DEPO-PROVERA) injection 150 mg, 150 mg, Intramuscular, 1 time in Clinic/HOD, Radha Sloan WHNP    Review of patient's allergies indicates:  No Known Allergies    Past Surgical History:   Procedure Laterality Date     SECTION  2022    CHOLECYSTECTOMY  2022     Family History   Problem Relation Name Age of Onset    Hypertension Mother       Social History     Socioeconomic History    Marital status: Single   Tobacco Use    Smoking status: Never    Smokeless tobacco: Never   Substance and Sexual Activity    Alcohol use: Not Currently    Drug use: Not Currently    Sexual activity: Yes     Partners: Male     Birth control/protection: None       Physical Exam:  /72 (BP Location: Right arm)   Ht 5' (1.524 m)   Wt 73.1 kg (161 lb 3.2 oz)   LMP 2024 (Exact Date)   Breastfeeding No   BMI 31.48 kg/m²     Chaperone: present.     General appearance: healthy, well-nourished and well-developed     Psychiatric: Orientation to time, place and person. Normal mood and affect and active, alert     Skin: Appearance: no rashes or lesions.     Neck:   Neck: supple, FROM, trachea midline. and no  masses   Thyroid: no enlargement or nodules and non-tender.       Cardiovascular:   Auscultation: RRR and no murmur.   Peripheral Vascular: no varicosities, LLE edema, RLE edema, calf tenderness, and palpable cord and pedal pulses intact.     Lungs:   Respiratory effort: no intercostal retractions or accessory muscle usage.   Auscultation: no wheezing, rales/crackles, or rhonchi and clear to auscultation.     Breast:   Inspection/Palpation: no tenderness, discrete/distinct masses, skin changes, or abnormal secretions. Nipple appearance normal.     Abdomen:   Auscultation/Inspection/Palpation: no hepatomegaly, splenomegaly, masses, tenderness or CVA tenderness and soft, non-distended bowel sounds preset.    Hernia: no palpable hernias.     Female Genitalia:    Vulva: no masses, tenderness or lesions    Bladder/Urethra: no urethral discharge or mass, normal meatus, bladder non-distended.    Vagina: no tenderness, erythema, cystocele, rectocele, abnormal vaginal discharge or vesicle(s) or ulcers    Cervix: no discharge, no cervical lacerations noted or motion tenderness and grossly normal    Uterus: normal size and shape and midline, non-tender, and no uterine prolapse.    Adnexa/Parametria: no parametrial tenderness or mass, no adnexal tenderness or ovarian mass.     Lymph Nodes:   Palpation: non tender submandibular nodes, axillary nodes, or inguinal nodes.     Rectal Exam:   Rectum: normal perianal skin.       Assessment/Plan:  1. Well woman exam with routine gynecological exam  Pap  Recommend BSE monthly  Discussed recommendations of annual screening after age of 40 with mammogram    Explained that screening is not 100% reliable. Advised patient if she notices any changes to her breast including a lump, mass, dimpling, discharge, rash, or tenderness she should contact us immediately.     Healthy diet, exercise   Multivitamin   Seat belt   Sunscreen use   Safe sex/ STI education   Contraception evaluation: none    Gardasil evaluation: 0/3    2. Menometrorrhagia  -     POCT urine pregnancy: negative  -     tranexamic acid (LYSTEDA) 650 mg tablet; Take 2 tablets (1,300 mg total) by mouth 3 (three) times daily. During the 5 heaviest days of cycle  Dispense: 30 tablet; Refill: 6  -     MDL Sendout Test:leuk, myco, urea    3. Cervical cancer screening  -     Liquid-Based Pap Smear, Screening Screening    4. Screening examination for STD (sexually transmitted disease)  -     Chlamydia trachomatis  -     Neisseria gonorrhoeae  -     Trichomonas vaginalis    RTC in 3 months to f/u on Lysteda or pending results.

## 2024-05-28 ENCOUNTER — HOSPITAL ENCOUNTER (EMERGENCY)
Facility: HOSPITAL | Age: 24
Discharge: HOME | End: 2024-05-29
Attending: INTERNAL MEDICINE
Payer: COMMERCIAL

## 2024-05-28 DIAGNOSIS — R07.9 CHEST PAIN, UNSPECIFIED TYPE: Primary | ICD-10-CM

## 2024-05-28 DIAGNOSIS — K52.9 GASTROENTERITIS: ICD-10-CM

## 2024-05-28 DIAGNOSIS — R07.9 CHEST PAIN: ICD-10-CM

## 2024-05-28 LAB
ALBUMIN SERPL-MCNC: 4.5 G/DL (ref 3.4–5)
ALBUMIN/GLOB SERPL: 1.3 RATIO
ALP SERPL-CCNC: 86 UNIT/L (ref 50–144)
ALT SERPL-CCNC: 23 UNIT/L (ref 1–45)
ANION GAP SERPL CALC-SCNC: 9 MEQ/L (ref 2–13)
AST SERPL-CCNC: 24 UNIT/L (ref 14–36)
BASOPHILS # BLD AUTO: 0.03 X10(3)/MCL (ref 0.01–0.08)
BASOPHILS NFR BLD AUTO: 0.2 % (ref 0.1–1.2)
BILIRUB SERPL-MCNC: 0.3 MG/DL (ref 0–1)
BNP BLD-MCNC: 29.5 PG/ML (ref 0–124.9)
BUN SERPL-MCNC: 14 MG/DL (ref 7–20)
CALCIUM SERPL-MCNC: 9.4 MG/DL (ref 8.4–10.2)
CHLORIDE SERPL-SCNC: 108 MMOL/L (ref 98–110)
CO2 SERPL-SCNC: 21 MMOL/L (ref 21–32)
CREAT SERPL-MCNC: 0.61 MG/DL (ref 0.66–1.25)
CREAT/UREA NIT SERPL: 23 (ref 12–20)
EOSINOPHIL # BLD AUTO: 0.03 X10(3)/MCL (ref 0.04–0.36)
EOSINOPHIL NFR BLD AUTO: 0.2 % (ref 0.7–7)
ERYTHROCYTE [DISTWIDTH] IN BLOOD BY AUTOMATED COUNT: 15.4 % (ref 11–14.5)
GFR SERPLBLD CREATININE-BSD FMLA CKD-EPI: >90 ML/MIN/1.73/M2
GLOBULIN SER-MCNC: 3.5 GM/DL (ref 2–3.9)
GLUCOSE SERPL-MCNC: 102 MG/DL (ref 70–115)
HCT VFR BLD AUTO: 37.9 % (ref 36–48)
HGB BLD-MCNC: 12.2 G/DL (ref 11.8–16)
IMM GRANULOCYTES # BLD AUTO: 0.04 X10(3)/MCL (ref 0–0.03)
IMM GRANULOCYTES NFR BLD AUTO: 0.2 % (ref 0–0.5)
LIPASE SERPL-CCNC: 61 U/L (ref 23–300)
LYMPHOCYTES # BLD AUTO: 1.32 X10(3)/MCL (ref 1.16–3.74)
LYMPHOCYTES NFR BLD AUTO: 7.9 % (ref 20–55)
MCH RBC QN AUTO: 25.4 PG (ref 27–34)
MCHC RBC AUTO-ENTMCNC: 32.2 G/DL (ref 31–37)
MCV RBC AUTO: 78.8 FL (ref 79–99)
MONOCYTES # BLD AUTO: 1.23 X10(3)/MCL (ref 0.24–0.36)
MONOCYTES NFR BLD AUTO: 7.3 % (ref 4.7–12.5)
NEUTROPHILS # BLD AUTO: 14.14 X10(3)/MCL (ref 1.56–6.13)
NEUTROPHILS NFR BLD AUTO: 84.2 % (ref 37–73)
NRBC BLD AUTO-RTO: 0 %
PLATELET # BLD AUTO: 267 X10(3)/MCL (ref 140–371)
PMV BLD AUTO: 9.5 FL (ref 9.4–12.4)
POTASSIUM SERPL-SCNC: 3.7 MMOL/L (ref 3.5–5.1)
PROT SERPL-MCNC: 8 GM/DL (ref 6.3–8.2)
RBC # BLD AUTO: 4.81 X10(6)/MCL (ref 4–5.1)
SODIUM SERPL-SCNC: 138 MMOL/L (ref 136–145)
TROPONIN I SERPL-MCNC: <0.012 NG/ML (ref 0–0.03)
TROPONIN I SERPL-MCNC: <0.012 NG/ML (ref 0–0.03)
WBC # SPEC AUTO: 16.79 X10(3)/MCL (ref 4–11.5)

## 2024-05-28 PROCEDURE — 84484 ASSAY OF TROPONIN QUANT: CPT | Performed by: INTERNAL MEDICINE

## 2024-05-28 PROCEDURE — 93010 ELECTROCARDIOGRAM REPORT: CPT | Mod: ,,, | Performed by: INTERNAL MEDICINE

## 2024-05-28 PROCEDURE — 99285 EMERGENCY DEPT VISIT HI MDM: CPT | Mod: 25

## 2024-05-28 PROCEDURE — 93005 ELECTROCARDIOGRAM TRACING: CPT

## 2024-05-28 PROCEDURE — 83880 ASSAY OF NATRIURETIC PEPTIDE: CPT | Performed by: INTERNAL MEDICINE

## 2024-05-28 PROCEDURE — 85025 COMPLETE CBC W/AUTO DIFF WBC: CPT | Performed by: INTERNAL MEDICINE

## 2024-05-28 PROCEDURE — 25500020 PHARM REV CODE 255: Performed by: INTERNAL MEDICINE

## 2024-05-28 PROCEDURE — 80053 COMPREHEN METABOLIC PANEL: CPT | Performed by: INTERNAL MEDICINE

## 2024-05-28 PROCEDURE — 63600175 PHARM REV CODE 636 W HCPCS: Performed by: INTERNAL MEDICINE

## 2024-05-28 PROCEDURE — 25000003 PHARM REV CODE 250: Performed by: INTERNAL MEDICINE

## 2024-05-28 PROCEDURE — 96365 THER/PROPH/DIAG IV INF INIT: CPT

## 2024-05-28 PROCEDURE — 96375 TX/PRO/DX INJ NEW DRUG ADDON: CPT

## 2024-05-28 PROCEDURE — 83690 ASSAY OF LIPASE: CPT | Performed by: INTERNAL MEDICINE

## 2024-05-28 PROCEDURE — 36415 COLL VENOUS BLD VENIPUNCTURE: CPT | Performed by: INTERNAL MEDICINE

## 2024-05-28 RX ORDER — METOCLOPRAMIDE HYDROCHLORIDE 5 MG/ML
10 INJECTION INTRAMUSCULAR; INTRAVENOUS
Status: COMPLETED | OUTPATIENT
Start: 2024-05-28 | End: 2024-05-28

## 2024-05-28 RX ORDER — KETOROLAC TROMETHAMINE 30 MG/ML
30 INJECTION, SOLUTION INTRAMUSCULAR; INTRAVENOUS
Status: COMPLETED | OUTPATIENT
Start: 2024-05-28 | End: 2024-05-28

## 2024-05-28 RX ADMIN — KETOROLAC TROMETHAMINE 30 MG: 30 INJECTION, SOLUTION INTRAMUSCULAR at 07:05

## 2024-05-28 RX ADMIN — SODIUM CHLORIDE 1000 ML: 9 INJECTION, SOLUTION INTRAVENOUS at 11:05

## 2024-05-28 RX ADMIN — PIPERACILLIN AND TAZOBACTAM 4.5 G: 4; .5 INJECTION, POWDER, FOR SOLUTION INTRAVENOUS; PARENTERAL at 11:05

## 2024-05-28 RX ADMIN — IOHEXOL 93 ML: 300 INJECTION, SOLUTION INTRAVENOUS at 09:05

## 2024-05-28 RX ADMIN — METOCLOPRAMIDE 10 MG: 5 INJECTION, SOLUTION INTRAMUSCULAR; INTRAVENOUS at 10:05

## 2024-05-28 NOTE — Clinical Note
"Abhay Sanchez" Shakir was seen and treated in our emergency department on 5/28/2024.  She may return to work on 05/30/2024.       If you have any questions or concerns, please don't hesitate to call.      DR SANCHEZ/HARLEY CHAIREZ RN RN    "

## 2024-05-29 VITALS
OXYGEN SATURATION: 94 % | BODY MASS INDEX: 31.61 KG/M2 | SYSTOLIC BLOOD PRESSURE: 106 MMHG | HEART RATE: 113 BPM | TEMPERATURE: 98 F | HEIGHT: 60 IN | RESPIRATION RATE: 18 BRPM | WEIGHT: 161 LBS | DIASTOLIC BLOOD PRESSURE: 62 MMHG

## 2024-05-29 LAB
B-HCG UR QL: NEGATIVE
BILIRUB UR QL STRIP.AUTO: NEGATIVE
CLARITY UR: CLEAR
COLOR UR AUTO: YELLOW
GLUCOSE UR QL STRIP: NEGATIVE
HGB UR QL STRIP: NEGATIVE
KETONES UR QL STRIP: 15
LEUKOCYTE ESTERASE UR QL STRIP: NEGATIVE
NITRITE UR QL STRIP: NEGATIVE
OHS QRS DURATION: 92 MS
OHS QTC CALCULATION: 455 MS
PH UR STRIP: 5.5 [PH]
PROT UR QL STRIP: 30
SP GR UR STRIP.AUTO: 1.02 (ref 1–1.03)
UROBILINOGEN UR STRIP-ACNC: 0.2

## 2024-05-29 PROCEDURE — 81025 URINE PREGNANCY TEST: CPT | Performed by: INTERNAL MEDICINE

## 2024-05-29 PROCEDURE — 81003 URINALYSIS AUTO W/O SCOPE: CPT | Performed by: INTERNAL MEDICINE

## 2024-05-29 PROCEDURE — 96361 HYDRATE IV INFUSION ADD-ON: CPT

## 2024-05-29 RX ORDER — LEVOFLOXACIN 750 MG/1
750 TABLET ORAL DAILY
Qty: 7 TABLET | Refills: 0 | Status: SHIPPED | OUTPATIENT
Start: 2024-05-29

## 2024-05-29 RX ORDER — ONDANSETRON 4 MG/1
4 TABLET, ORALLY DISINTEGRATING ORAL EVERY 6 HOURS PRN
Qty: 20 TABLET | Refills: 0 | Status: SHIPPED | OUTPATIENT
Start: 2024-05-29

## 2024-05-29 RX ORDER — DICYCLOMINE HYDROCHLORIDE 10 MG/1
10 CAPSULE ORAL
Qty: 20 CAPSULE | Refills: 0 | Status: SHIPPED | OUTPATIENT
Start: 2024-05-29 | End: 2024-06-28

## 2024-05-29 NOTE — DISCHARGE INSTRUCTIONS
Take the medications as prescribed and to emergency room if symptoms worsen.  Follow up with cardiologist for further evaluation of chest pain.  Follow up with gastroenterologist if head if symptoms persist like vomiting and diarrhea.

## 2024-05-29 NOTE — ED PROVIDER NOTES
Encounter Date: 2024       History     Chief Complaint   Patient presents with    Chest Pain     Pt to er via [facility], pt states about an hour pta she began having chest pain that she feels in her back. Pt n/v x1 pta, has hx of back nerve damage.      This is a 23-year-old female patient came into the emergency room with history of having chest pain that started in the substernal area sharp achy type sometimes radiating to the back.  Patient does have history of scoliosis, bulging disc.  Denies any history of hypertension, diabetes, dyslipidemia.  Patient reports having nausea, vomiting.  Denies any abdominal pain.  Patient has been given 4 mg of Zofran prior to coming into the emergency room by EMS.  Denies cough, congestion, fever.  No shortness of breath or palpitations.  Patient does have history of anxiety, bulging disc, scoliosis, chronic anemia.  No flank pain, dysuria.  No hematuria.        Review of patient's allergies indicates:  No Known Allergies  Past Medical History:   Diagnosis Date    Anemia     Anxiety disorder, unspecified     Bulging disc     Heart murmur     Scoliosis      Past Surgical History:   Procedure Laterality Date     SECTION  2022    CHOLECYSTECTOMY  2022     Family History   Problem Relation Name Age of Onset    Hypertension Mother       Social History     Tobacco Use    Smoking status: Never    Smokeless tobacco: Never   Substance Use Topics    Alcohol use: Not Currently    Drug use: Not Currently     Review of Systems   Constitutional:  Positive for fatigue. Negative for fever.   HENT:  Negative for dental problem and ear pain.    Eyes: Negative.    Respiratory:  Negative for cough, shortness of breath, wheezing and stridor.    Cardiovascular:  Positive for chest pain.   Gastrointestinal:  Positive for nausea and vomiting. Negative for abdominal pain.   Endocrine: Negative for polydipsia, polyphagia and polyuria.   Genitourinary:  Negative for dysuria and flank  pain.   Musculoskeletal:  Negative for back pain and myalgias.   Skin:  Negative for rash.   Neurological:  Negative for seizures, numbness and headaches.   Psychiatric/Behavioral:  Negative for confusion.    All other systems reviewed and are negative.      Physical Exam     Initial Vitals [05/28/24 1942]   BP Pulse Resp Temp SpO2   112/80 84 18 98.2 °F (36.8 °C) 100 %      MAP       --         Physical Exam    Nursing note and vitals reviewed.  Constitutional: She appears well-developed and well-nourished.   HENT:   Head: Normocephalic and atraumatic.   Eyes: Conjunctivae and EOM are normal.   Neck: Neck supple.   Normal range of motion.  Cardiovascular:  Normal rate, regular rhythm and normal heart sounds.           Pulmonary/Chest: Breath sounds normal.   Abdominal: Abdomen is soft. Bowel sounds are normal.   Musculoskeletal:         General: Normal range of motion.      Cervical back: Normal range of motion and neck supple.     Neurological: She is alert and oriented to person, place, and time. She has normal strength. GCS score is 15. GCS eye subscore is 4. GCS verbal subscore is 5. GCS motor subscore is 6.   Skin: Skin is warm and dry. Capillary refill takes less than 2 seconds.   Psychiatric: She has a normal mood and affect.         ED Course   Procedures  Labs Reviewed   COMPREHENSIVE METABOLIC PANEL - Abnormal; Notable for the following components:       Result Value    Creatinine 0.61 (*)     BUN/Creatinine Ratio 23 (*)     All other components within normal limits   URINALYSIS - Abnormal; Notable for the following components:    Protein, UA 30 (*)     Ketones, UA 15 (*)     All other components within normal limits    Narrative:      URINE STABILITY IS 2 HOURS AT ROOM TEMP OR    SIX HOURS REFRIGERATED. PERFORMING TESTING ON    SPECIMENS GREATER THAN THIS AGE MAY AFFECT THE    FOLLOWING TESTS:    PH          SPECIFIC GRAVITY           BLOOD    CLARITY     BILIRUBIN               UROBILINOGEN   CBC WITH  DIFFERENTIAL - Abnormal; Notable for the following components:    WBC 16.79 (*)     MCV 78.8 (*)     MCH 25.4 (*)     RDW 15.4 (*)     Neut % 84.2 (*)     Lymph % 7.9 (*)     Eos % 0.2 (*)     Neut # 14.14 (*)     Mono # 1.23 (*)     Eos # 0.03 (*)     IG# 0.04 (*)     All other components within normal limits   TROPONIN I - Normal   NT-PRO NATRIURETIC PEPTIDE - Normal   HCG QUALITATIVE URINE - Normal   LIPASE - Normal   TROPONIN I - Normal   CBC W/ AUTO DIFFERENTIAL    Narrative:     The following orders were created for panel order CBC auto differential.  Procedure                               Abnormality         Status                     ---------                               -----------         ------                     CBC with Differential[[id number]]       Abnormal            Final result                 Please view results for these tests on the individual orders.     EKG Readings: (Independently Interpreted)   EKG shows normal sinus rhythm with heart rate of 90 beats per minute, MT interval 146 QRS duration 92  milliseconds.  No ST depressions or ST elevations.       Imaging Results              CT Abdomen Pelvis With IV Contrast NO Oral Contrast (Preliminary result)  Result time 05/28/24 22:19:45      Preliminary result by Adama Ragsdale Jr., MD (05/28/24 22:19:45)                   Narrative:    START OF REPORT:  Technique: CT of the abdomen and pelvis was performed with axial images as well as sagittal and coronal reconstruction images with intravenous contrast.    Comparison: None available.    Clinical History: LUQ ABD PN NAUSEA WBC 16.4 93CC OMNIPAQUE 300 IV CONTRAST.    Dosage Information: Automated Exposure Control was utilized.    Findings:  Lines and Tubes: None.  Thorax:  Lungs: The visualized lung bases appear unremarkable.  Pleura: No effusions or thickening.  Heart: The heart size is within normal limits.  Abdomen:  Abdominal Wall: No abdominal wall pathology is seen.  Liver:  The liver appears unremarkable.  Biliary System: No intrahepatic or extrahepatic biliary duct dilatation is seen.  Gallbladder: The gallbladder appears unremarkable.  Pancreas: The pancreas appears unremarkable.  Spleen: The spleen appears unremarkable.  Adrenals: The adrenal glands appear unremarkable.  Kidneys: The kidneys appear unremarkable with no stones cysts masses or hydronephrosis with IV contrast decreasing sensitivity and specificity for stones.  Aorta: The abdominal aorta appears unremarkable.  IVC: Unremarkable.  Bowel:  Esophagus: The visualized esophagus appears unremarkable.  Stomach: The stomach appears unremarkable.  Duodenum: Unremarkable appearing duodenum.  Small Bowel: There are fluid filled small bowel loops which are mildly distended and demonstrate mucosal enhancement.  Colon: Nondistended.  Appendix: The appendix is not identified but no inflammatory changes are seen in the right lower quadrant to suggest appendicitis.  Peritoneum: No intraperitoneal free air or ascites is seen.    Pelvis:  Bladder: The bladder appears unremarkable.  Female:  Uterus: The uterus appears unremarkable for age.  Ovaries: The ovaries appear unremarkable with probable bilateral physiologic cysts.    Bony structures:  Dorsal Spine: There is mild levoconvex scoliosis of the mid lumbar spine.  Bony Pelvis: The visualized bony structures of the pelvis appear unremarkable.      Impression:  1. There are fluid filled small bowel loops which are mildly distended and demonstrate mucosal enhancement. This could reflect an element of enteritis. Correlate with clinical and laboratory findings as regards additional evaluation and follow-up.  2. Details and other findings as discussed above.                                         X-Ray Chest 1 View (Final result)  Result time 05/28/24 20:08:39      Final result by Ronaldo Nelson MD (05/28/24 20:08:39)                   Impression:      No acute  abnormality.      Electronically signed by: Ronaldo Omar  Date:    05/28/2024  Time:    20:08               Narrative:    EXAMINATION:  XR CHEST 1 VIEW    CLINICAL HISTORY:  Chest pain, unspecified    TECHNIQUE:  Single frontal view of the chest was performed.    COMPARISON:  02/28/2023    FINDINGS:  The lungs are clear, with normal appearance of pulmonary vasculature and no pleural effusion or pneumothorax.    The cardiac silhouette is normal in size. The hilar and mediastinal contours are unremarkable.    Bones are intact.                                    X-Rays:   Independently Interpreted Readings:   Other Readings:  X-ray chest shows no acute cardiopulmonary process    CT abdomen and pelvis shows:   Impression:  1. There are fluid filled small bowel loops which are mildly distended and demonstrate mucosal enhancement. This could reflect an element of enteritis. Correlate with clinical and laboratory findings as regards additional evaluation and follow-up.  2. Details and other findings as discussed above.      Medications   ketorolac injection 30 mg (30 mg Intravenous Given 5/28/24 1948)   iohexoL (OMNIPAQUE 300) injection 100 mL (93 mLs Intravenous Given 5/28/24 2114)   metoclopramide injection 10 mg (10 mg Intravenous Given 5/28/24 2221)   piperacillin-tazobactam (ZOSYN) 4.5 g in dextrose 5 % in water (D5W) 100 mL IVPB (MB+) (0 g Intravenous Stopped 5/29/24 0010)   sodium chloride 0.9% bolus 1,000 mL 1,000 mL (0 mLs Intravenous Stopped 5/29/24 0125)     Medical Decision Making  This is a 23-year-old female patient came into the emergency room with history of having chest pain that started in the substernal area sharp achy type sometimes radiating to the back.  Patient does have history of scoliosis, bulging disc.  Denies any history of hypertension, diabetes, dyslipidemia.  Patient reports having nausea, vomiting.  Denies any abdominal pain.  Patient has been given 4 mg of Zofran prior to coming into the  emergency room by EMS.  Denies cough, congestion, fever.  No shortness of breath or palpitations.  Patient does have history of anxiety, bulging disc, scoliosis, chronic anemia.  No flank pain, dysuria.  No hematuria.    Patient has elevated white count of 16,000.  Given Zosyn, Zofran, IV fluids and Reglan.    Advised to take medications as prescribed and follow up with primary doctor.      Differential diagnosis: 1.  Chest pain 2. UTI 3. Gastroenteritis 4. Dehydration    Amount and/or Complexity of Data Reviewed  Labs: ordered.  Radiology: ordered.    Risk  Prescription drug management.                                      Clinical Impression:  Final diagnoses:  [R07.9] Chest pain, unspecified type (Primary)                 Efrain Stoner DO  05/29/24 0137

## 2024-06-28 ENCOUNTER — OFFICE VISIT (OUTPATIENT)
Dept: OBSTETRICS AND GYNECOLOGY | Facility: CLINIC | Age: 24
End: 2024-06-28
Payer: COMMERCIAL

## 2024-06-28 VITALS
BODY MASS INDEX: 31.41 KG/M2 | WEIGHT: 160 LBS | SYSTOLIC BLOOD PRESSURE: 122 MMHG | DIASTOLIC BLOOD PRESSURE: 70 MMHG | HEIGHT: 60 IN

## 2024-06-28 DIAGNOSIS — N92.1 MENOMETRORRHAGIA: Primary | ICD-10-CM

## 2024-06-28 DIAGNOSIS — N94.6 DYSMENORRHEA: ICD-10-CM

## 2024-06-28 PROCEDURE — 1159F MED LIST DOCD IN RCRD: CPT | Mod: CPTII,,,

## 2024-06-28 PROCEDURE — 3008F BODY MASS INDEX DOCD: CPT | Mod: CPTII,,,

## 2024-06-28 PROCEDURE — 99213 OFFICE O/P EST LOW 20 MIN: CPT | Mod: ,,,

## 2024-06-28 PROCEDURE — 3078F DIAST BP <80 MM HG: CPT | Mod: CPTII,,,

## 2024-06-28 PROCEDURE — 1160F RVW MEDS BY RX/DR IN RCRD: CPT | Mod: CPTII,,,

## 2024-06-28 PROCEDURE — 3074F SYST BP LT 130 MM HG: CPT | Mod: CPTII,,,

## 2024-06-28 NOTE — PROGRESS NOTES
Chief Complaint:  f/u lysteda    History of Present Illness:  Patient is a 23 y.o.  presents to follow Lysteda secondary to menometrorrhagia. States no cycle with depo provera, only had to take one dose of the Lysteda. +mild cramping, tolerable.       Gyn History:  Menstrual History   Cycle: No (No menses on Depo)  Menarche Age: 17 years  Intermenstrual Bleeding: No  Dysmenorrhea: Yes  Dysmenorrhea Severity : (!) Severe  No Cycle Reason: Medical  [place]  Sexually Active: Yes  Sexual Orientation: heterosexual  Postcoital Bleeding: No  Dyspareunia: No  STI History: No  Contraception: Yes  Contraception Type: Depo-Provera  Menopause  Menopause Age: 2 years  Post Menopausal Bleeding: No  Hormone Replacement Therapy: No  Breast History  Last Breast Imaging Date: No  History of Breast Biopsy: No  Pap History   Last pap date: 24  Result: Normal  History of Abnormal Pap: No  HPV Vaccine Completed: No      Review of systems:  General/Constitutional: Chills denies. Fatigue/weakness denies. Fever denies. Night sweats denies. Hot flashes denies  Breast: Dimpling denies. Breast mass denies. Breast pain/tenderness denies. Nipple discharge denies. Puckering denies.  Gastrointestinal: Abdominal pain denies. Blood in stool denies. Constipation denies. Diarrhea denies. Heartburn denies. Nausea denies. Vomiting denies   Genitourinary: Incontinence denies. Blood in urine denies. Frequent urination denies. Urgency denies. Painful urination denies. Nocturia denies   Gynecologic: Irregular menses denies. Heavy bleeding denies. Painful menses denies. Vaginal discharge denies. Vaginal odor denies. Vaginal itching/Irritation denies. Vaginal lesion denies.  Pelvic pain denies. Decreased libido denies. Vulvar lesion denies. Prolapse of genital organs denies. Painful intercourse denies. Postcoital bleeding denies   Psychiatric: Mood lability denies. Depressed mood denies. Suicidal thoughts denies. Anxiety denies. Overwhelmed  denies. Appetite normal. Energy level normal.     OB History    Para Term  AB Living   1 1 0 1 0 1   SAB IAB Ectopic Multiple Live Births   0 0 0 0 1      # 1 - Date: 24, Sex: Male, Weight: 2.551 kg (5 lb 10 oz), GA: 36w0d, Type: , Low Transverse, Apgar1: None, Apgar5: None, Living: Living, Birth Comments: None      Past Medical History:   Diagnosis Date    Anemia     Anxiety disorder, unspecified     Bulging disc     Heart murmur     Scoliosis        Current Outpatient Medications:     dicyclomine (BENTYL) 10 MG capsule, Take 1 capsule (10 mg total) by mouth 4 (four) times daily before meals and nightly., Disp: 20 capsule, Rfl: 0    gabapentin (NEURONTIN) 300 MG capsule, Take 300 mg by mouth 4 (four) times daily., Disp: , Rfl:     ondansetron (ZOFRAN-ODT) 4 MG TbDL, Take 1 tablet (4 mg total) by mouth every 6 (six) hours as needed (Nausea and vomiting)., Disp: 20 tablet, Rfl: 0      Physical Exam:  /70 (BP Location: Right arm)   Ht 5' (1.524 m)   Wt 72.6 kg (160 lb)   BMI 31.25 kg/m²     Constitutional: General appearance: healthy, well-nourished and well-developed   Psychiatric:  Orientation to time, place and person. Normal mood and affect and active, alert       Assessment/Plan:  1. Menometrorrhagia  Educated  Bleeding resolved  No cycle with depo provera, content  Desires to continue  Advised to keep future depo appt     +MH, tx'ed w/ Doxycycline. ELVIA is negative    2. Dysmenorrhea  Educated    NSAIDs, heating pad, warm bath    Pain/ER precautions   Stop Lysteda d/t control with DMPA          This note was transcribed by Pura Staton MA. There may be transcription errors as a result, however minimal. Effort has been made to ensure accuracy of transcription, but any obvious errors or omissions should be clarified with the author of the document.

## 2024-07-14 ENCOUNTER — HOSPITAL ENCOUNTER (EMERGENCY)
Facility: HOSPITAL | Age: 24
Discharge: HOME | End: 2024-07-14
Attending: SURGERY
Payer: COMMERCIAL

## 2024-07-14 VITALS
DIASTOLIC BLOOD PRESSURE: 77 MMHG | WEIGHT: 160 LBS | RESPIRATION RATE: 20 BRPM | OXYGEN SATURATION: 99 % | BODY MASS INDEX: 31.41 KG/M2 | HEART RATE: 102 BPM | TEMPERATURE: 99 F | SYSTOLIC BLOOD PRESSURE: 126 MMHG | HEIGHT: 60 IN

## 2024-07-14 DIAGNOSIS — J01.90 ACUTE BACTERIAL SINUSITIS: ICD-10-CM

## 2024-07-14 DIAGNOSIS — E66.9 OBESITY (BMI 30-39.9): ICD-10-CM

## 2024-07-14 DIAGNOSIS — B96.89 ACUTE BACTERIAL SINUSITIS: ICD-10-CM

## 2024-07-14 DIAGNOSIS — R51.9 ACUTE NONINTRACTABLE HEADACHE, UNSPECIFIED HEADACHE TYPE: Primary | ICD-10-CM

## 2024-07-14 PROCEDURE — 96372 THER/PROPH/DIAG INJ SC/IM: CPT | Performed by: SURGERY

## 2024-07-14 PROCEDURE — 99284 EMERGENCY DEPT VISIT MOD MDM: CPT | Mod: 25

## 2024-07-14 PROCEDURE — 63600175 PHARM REV CODE 636 W HCPCS: Performed by: SURGERY

## 2024-07-14 PROCEDURE — 25000003 PHARM REV CODE 250: Performed by: SURGERY

## 2024-07-14 RX ORDER — AMOXICILLIN AND CLAVULANATE POTASSIUM 875; 125 MG/1; MG/1
1 TABLET, FILM COATED ORAL EVERY 12 HOURS
Qty: 20 TABLET | Refills: 0 | Status: SHIPPED | OUTPATIENT
Start: 2024-07-14 | End: 2024-07-24

## 2024-07-14 RX ORDER — DEXAMETHASONE 4 MG/1
4 TABLET ORAL ONCE
Status: COMPLETED | OUTPATIENT
Start: 2024-07-14 | End: 2024-07-14

## 2024-07-14 RX ORDER — KETOROLAC TROMETHAMINE 30 MG/ML
30 INJECTION, SOLUTION INTRAMUSCULAR; INTRAVENOUS ONCE
Status: COMPLETED | OUTPATIENT
Start: 2024-07-14 | End: 2024-07-14

## 2024-07-14 RX ADMIN — LIDOCAINE HYDROCHLORIDE 500 MG: 10 INJECTION, SOLUTION INFILTRATION; PERINEURAL at 06:07

## 2024-07-14 RX ADMIN — DEXAMETHASONE 4 MG: 4 TABLET ORAL at 06:07

## 2024-07-14 RX ADMIN — KETOROLAC TROMETHAMINE 30 MG: 30 INJECTION, SOLUTION INTRAMUSCULAR at 06:07

## 2024-07-14 NOTE — DISCHARGE INSTRUCTIONS
FOLLOW UP WITH PERSONAL PHYSICIAN. RETURN TO ER IF SYMPTOMS INCREASE OR IF NEW SYMPTOMS DEVELOP.

## 2024-07-14 NOTE — Clinical Note
"Abhay Villagomezscottie Schilling was seen and treated in our emergency department on 7/14/2024.  She may return to work on 07/16/2024.       If you have any questions or concerns, please don't hesitate to call.      Ace Sanabria MD"

## 2024-07-14 NOTE — ED PROVIDER NOTES
"Encounter Date: 2024       History     Chief Complaint   Patient presents with    Headache     Pt ambulatory to triage with c/o facial pain to lt side, sore throat cough and chest tightness.  Onset .     24 YO OBESE AAF W/ ACUTE ATRAUMATIC ONSET FRONTAL CEPHALGIA W/ RETROBULBAR EXTENSION.  NO CHANGE AUDITORY OR VISUAL ACUITY.  NO FC.  NO Hx/o MIGRAINES/ICN/ICP/ICS/ICH.  +GREEN NASAL DISCHARGE W/ AM EXPECTORANT ANALOGOUS.  +LEFT OTIC FULLNESS W/ "POPPING".  NO TINNITUS.  NO NUCHAL RIGIDITY.  NO PHOTOBPHOBIA.        Review of patient's allergies indicates:  No Known Allergies  Past Medical History:   Diagnosis Date    Anemia     Anxiety disorder, unspecified     Bulging disc     Heart murmur     Scoliosis      Past Surgical History:   Procedure Laterality Date     SECTION  2022    CHOLECYSTECTOMY  2022     Family History   Problem Relation Name Age of Onset    Hypertension Mother       Social History     Tobacco Use    Smoking status: Never    Smokeless tobacco: Never   Substance Use Topics    Alcohol use: Not Currently    Drug use: Not Currently     Review of Systems   All other systems reviewed and are negative.      Physical Exam     Initial Vitals [24 1741]   BP Pulse Resp Temp SpO2   129/79 87 20 99 °F (37.2 °C) 100 %      MAP       --         Physical Exam    Vitals reviewed.  Constitutional: She appears well-developed and well-nourished.   HENT:   Head: Normocephalic and atraumatic.   Right Ear: External ear normal.   Left Ear: External ear normal.   Nose: Nose normal.   Mouth/Throat: Oropharynx is clear and moist.   Sx INCREASED W/ FORWARD HEADTILT     Eyes: Conjunctivae and EOM are normal. Pupils are equal, round, and reactive to light. No scleral icterus.   Neck: Neck supple. No thyromegaly present. No tracheal deviation present. No JVD present.   Normal range of motion.  Cardiovascular:  Normal rate, regular rhythm, normal heart sounds and intact distal pulses.     Exam " reveals no gallop.       No murmur heard.  Pulmonary/Chest: Breath sounds normal. No stridor. No respiratory distress. She has no wheezes. She has no rhonchi. She has no rales.   Musculoskeletal:      Cervical back: Normal range of motion and neck supple.     Neurological: She is alert and oriented to person, place, and time. She has normal strength. No cranial nerve deficit or sensory deficit. GCS score is 15. GCS eye subscore is 4. GCS verbal subscore is 5. GCS motor subscore is 6.   EOMI/PERRL  NO APHASIA/NO DYSARTHRIA/NO TREMOR/NO NYSTAGMUS  CLARISSA  B SYMMETRIC FACIES  GLOSSUS MIDLINE  STRENGTH 5/5 GLOBALLY  NO PHOTOPHOBIA  NO MENINGISUMUS     Skin: Skin is warm. Capillary refill takes less than 2 seconds.   Psychiatric: She has a normal mood and affect. Her behavior is normal. Judgment and thought content normal.         ED Course   Procedures  Labs Reviewed - No data to display       Imaging Results    None          Medications   ketorolac injection 30 mg (has no administration in time range)   cefTRIAXone (Rocephin) 500 mg in LIDOcaine HCL 10 mg/ml (1%) 2 mL IM only syringe (has no administration in time range)   dexAMETHasone tablet 4 mg (has no administration in time range)     Medical Decision Making                                    Clinical Impression:  Final diagnoses:  [R51.9] Acute nonintractable headache, unspecified headache type (Primary)  [J01.90, B96.89] Acute bacterial sinusitis  [E66.9] Obesity (BMI 30-39.9)          ED Disposition Condition    Discharge Stable          ED Prescriptions       Medication Sig Dispense Start Date End Date Auth. Provider    amoxicillin-clavulanate 875-125mg (AUGMENTIN) 875-125 mg per tablet Take 1 tablet by mouth every 12 (twelve) hours. for 10 days 20 tablet 7/14/2024 7/24/2024 Ace Sanabria MD          Follow-up Information    None          Ace Sanabria MD  07/14/24 0005

## 2024-07-26 ENCOUNTER — OFFICE VISIT (OUTPATIENT)
Dept: OBSTETRICS AND GYNECOLOGY | Facility: CLINIC | Age: 24
End: 2024-07-26
Payer: COMMERCIAL

## 2024-07-26 VITALS
DIASTOLIC BLOOD PRESSURE: 74 MMHG | BODY MASS INDEX: 31.45 KG/M2 | TEMPERATURE: 97 F | SYSTOLIC BLOOD PRESSURE: 124 MMHG | HEIGHT: 60 IN | WEIGHT: 160.19 LBS

## 2024-07-26 DIAGNOSIS — Z30.013 ENCOUNTER FOR INITIAL PRESCRIPTION OF INJECTABLE CONTRACEPTIVE: Primary | ICD-10-CM

## 2024-07-26 DIAGNOSIS — N92.1 BREAKTHROUGH BLEEDING ON DEPO PROVERA: ICD-10-CM

## 2024-07-26 LAB
B-HCG UR QL: NEGATIVE
CTP QC/QA: YES

## 2024-07-26 RX ORDER — MEDROXYPROGESTERONE ACETATE 150 MG/ML
150 INJECTION, SUSPENSION INTRAMUSCULAR
Status: COMPLETED | OUTPATIENT
Start: 2024-07-26 | End: 2024-07-26

## 2024-07-26 RX ORDER — ESCITALOPRAM OXALATE 5 MG/1
5 TABLET, FILM COATED ORAL
COMMUNITY
Start: 2024-07-03

## 2024-07-26 RX ORDER — DICYCLOMINE HYDROCHLORIDE 20 MG/1
20 TABLET ORAL
COMMUNITY
Start: 2024-06-12

## 2024-07-26 RX ADMIN — MEDROXYPROGESTERONE ACETATE 150 MG: 150 INJECTION, SUSPENSION INTRAMUSCULAR at 10:07

## 2024-07-26 NOTE — PROGRESS NOTES
Chief Complaint     Contraception (Depo 2024/-UPT)    HPI:     Patient is a 24 y.o.  presents today for 2nd depo provera injection. Last injection 24. Reports bleeding x2 weeks , states to be lighter in flow at this time. Denies discharge, odor, pain.     Gyn History:    Menstrual History  Cycle: Yes (2024)  Menarche Age: 17 years  Flow Duration: 14  Flow: Light  Interval:  (irregular)  Intermenstrual Bleeding: Yes  Dysmenorrhea: Yes  Dysmenorrhea Severity : (!) Severe    Menopause  Menopause Age: 2 years    Pap History  Last pap date: 24  Result: Normal  History of Abnormal Pap: No  HPV Vaccine Completed: No (0/3)    Cable  Sexually Active: Yes  Sexual Orientation: heterosexual  Postcoital Bleeding: No  Dyspareunia: No  STI History: No  Contraception: Yes  Contraception Type: Depo-Provera (2024)    Breast History  Last Breast Imaging Date: No  History of Breast Biopsy: No          Past Medical History:   Diagnosis Date    Anemia     Anxiety disorder, unspecified     Bulging disc     Heart murmur     Scoliosis        Past Surgical History:   Procedure Laterality Date     SECTION  2022    CHOLECYSTECTOMY  2022       Family History   Problem Relation Name Age of Onset    Hypertension Mother      Breast cancer Maternal Aunt          age unknown    Colon cancer Neg Hx      Ovarian cancer Neg Hx      Uterine cancer Neg Hx         OB History          1    Para   1    Term           1    AB        Living   1         SAB        IAB        Ectopic        Multiple        Live Births   1                 Current Outpatient Medications on File Prior to Visit   Medication Sig Dispense Refill    dicyclomine (BENTYL) 20 mg tablet Take 20 mg by mouth.      gabapentin (NEURONTIN) 300 MG capsule Take 300 mg by mouth 4 (four) times daily.      LEXAPRO 5 mg Tab Take 5 mg by mouth.      ondansetron (ZOFRAN-ODT) 4 MG TbDL Take 1 tablet (4 mg total) by mouth  every 6 (six) hours as needed (Nausea and vomiting). 20 tablet 0     No current facility-administered medications on file prior to visit.       Review of Systems:       Review of Systems   Constitutional:  Negative for chills and fever.   Gastrointestinal:  Negative for abdominal pain, constipation and diarrhea.   Genitourinary:  Positive for vaginal bleeding. Negative for bladder incontinence, decreased libido, dysmenorrhea, dyspareunia, dysuria, flank pain, frequency, genital sores, hematuria, hot flashes, menorrhagia, menstrual problem, pelvic pain, urgency, vaginal discharge, vaginal pain, urinary incontinence, postcoital bleeding, postmenopausal bleeding, vaginal dryness and vaginal odor.        Physical Exam:    /74 (BP Location: Left arm, Patient Position: Sitting, BP Method: Medium (Manual))   Temp 96.8 °F (36 °C) (Temporal)   Ht 5' (1.524 m)   Wt 72.7 kg (160 lb 3.2 oz)   LMP 07/12/2024 (Approximate)   BMI 31.29 kg/m²     Physical Exam   General Exam:    General Appearance: alert, in no acute distress, normal, well nourished.  Psych:  Orientation: time, place, person.  Mood/Affect: Normal       Assessment:   1. Encounter for initial prescription of injectable contraceptive  -     medroxyPROGESTERone (DEPO-PROVERA) injection 150 mg    2. Breakthrough bleeding on depo provera  -     POCT Urine Pregnancy  -     MDL Sendout Test             Plan:   1. Encounter for initial prescription of injectable contraceptive  - medroxyPROGESTERone (DEPO-PROVERA) injection 150 mg    2. Breakthrough bleeding on depo provera  - POCT Urine Pregnancy  - MDL Sendout Test    Discussed potential irregular bleeding Depo Provera injection  UPT today in clinic negative   GC CZ TV MYCO UREA collected on urine. Will call for results.  Desires injection today     DMPA injection given by nurse, tolerated well  Desires to cont DMPA at this time  Cont DMPA q 12 weeks    RTC 3 months for DMPA injection           This note was  transcribed by Radha Gonzalez. There may be transcription errors as a result, however minimal. Effort has been made to ensure accuracy of transcription, but any obvious errors or omissions should be clarified with the author of the document.

## 2024-08-17 ENCOUNTER — HOSPITAL ENCOUNTER (EMERGENCY)
Facility: HOSPITAL | Age: 24
Discharge: HOME | End: 2024-08-17
Attending: FAMILY MEDICINE
Payer: COMMERCIAL

## 2024-08-17 VITALS
HEART RATE: 82 BPM | WEIGHT: 161 LBS | HEIGHT: 60 IN | TEMPERATURE: 98 F | BODY MASS INDEX: 31.61 KG/M2 | DIASTOLIC BLOOD PRESSURE: 90 MMHG | RESPIRATION RATE: 16 BRPM | OXYGEN SATURATION: 98 % | SYSTOLIC BLOOD PRESSURE: 131 MMHG

## 2024-08-17 DIAGNOSIS — J06.9 UPPER RESPIRATORY TRACT INFECTION, UNSPECIFIED TYPE: Primary | ICD-10-CM

## 2024-08-17 LAB
INFLUENZA A (OHS): NEGATIVE
INFLUENZA B (OHS): NEGATIVE
RAPID GROUP A STREP (OHS): NEGATIVE
SARS-COV-2 RDRP RESP QL NAA+PROBE: NEGATIVE

## 2024-08-17 PROCEDURE — 63600175 PHARM REV CODE 636 W HCPCS: Performed by: NURSE PRACTITIONER

## 2024-08-17 PROCEDURE — U0002 COVID-19 LAB TEST NON-CDC: HCPCS | Performed by: NURSE PRACTITIONER

## 2024-08-17 PROCEDURE — 99284 EMERGENCY DEPT VISIT MOD MDM: CPT | Mod: 25

## 2024-08-17 PROCEDURE — 96372 THER/PROPH/DIAG INJ SC/IM: CPT | Performed by: NURSE PRACTITIONER

## 2024-08-17 PROCEDURE — 25000003 PHARM REV CODE 250: Performed by: NURSE PRACTITIONER

## 2024-08-17 PROCEDURE — 87651 STREP A DNA AMP PROBE: CPT | Performed by: NURSE PRACTITIONER

## 2024-08-17 PROCEDURE — 87400 INFLUENZA A/B EACH AG IA: CPT | Performed by: NURSE PRACTITIONER

## 2024-08-17 RX ORDER — ONDANSETRON 4 MG/1
8 TABLET, ORALLY DISINTEGRATING ORAL
Status: COMPLETED | OUTPATIENT
Start: 2024-08-17 | End: 2024-08-17

## 2024-08-17 RX ORDER — KETOROLAC TROMETHAMINE 30 MG/ML
60 INJECTION, SOLUTION INTRAMUSCULAR; INTRAVENOUS
Status: COMPLETED | OUTPATIENT
Start: 2024-08-17 | End: 2024-08-17

## 2024-08-17 RX ADMIN — KETOROLAC TROMETHAMINE 60 MG: 30 INJECTION, SOLUTION INTRAMUSCULAR at 07:08

## 2024-08-17 RX ADMIN — ONDANSETRON 8 MG: 4 TABLET, ORALLY DISINTEGRATING ORAL at 07:08

## 2024-08-18 NOTE — ED PROVIDER NOTES
"Encounter Date: 2024       History     Chief Complaint   Patient presents with    Headache    Weakness    Nausea     Reports that she has been having a left sided headache, nausea, chest tightness, and weakness since 1500 this afternoon. Reports feeling "sweaty" pt is not diaphoretic in triage. Reports that the headache is the worst.      Abhay presents with a HA and body aches that started around 1300 today.             Review of patient's allergies indicates:  No Known Allergies  Past Medical History:   Diagnosis Date    Anemia     Anxiety disorder, unspecified     Bulging disc     Heart murmur     Scoliosis      Past Surgical History:   Procedure Laterality Date     SECTION  2022    CHOLECYSTECTOMY  2022     Family History   Problem Relation Name Age of Onset    Hypertension Mother      Breast cancer Maternal Aunt          age unknown    Colon cancer Neg Hx      Ovarian cancer Neg Hx      Uterine cancer Neg Hx       Social History     Tobacco Use    Smoking status: Never     Passive exposure: Never    Smokeless tobacco: Never   Substance Use Topics    Alcohol use: Not Currently    Drug use: Not Currently     Review of Systems   Constitutional:  Positive for fatigue. Negative for fever.   HENT:  Positive for rhinorrhea. Negative for congestion.    Eyes:  Negative for visual disturbance.   Respiratory:  Negative for cough, shortness of breath and wheezing.    Cardiovascular:  Negative for chest pain and palpitations.   Gastrointestinal:  Positive for nausea. Negative for abdominal distention, abdominal pain, diarrhea and vomiting.   Endocrine: Negative for cold intolerance and heat intolerance.   Genitourinary:  Negative for dysuria.   Musculoskeletal:  Positive for myalgias. Negative for arthralgias.   Skin:  Negative for rash.   Neurological:  Positive for headaches. Negative for dizziness, weakness, light-headedness and numbness.   Hematological:  Negative for adenopathy. Does not " bruise/bleed easily.   Psychiatric/Behavioral:  Negative for dysphoric mood. The patient is not nervous/anxious.        Physical Exam     Initial Vitals [08/17/24 1911]   BP Pulse Resp Temp SpO2   (!) 131/90 82 16 97.8 °F (36.6 °C) 98 %      MAP       --         Physical Exam    Nursing note and vitals reviewed.  Constitutional: She appears well-developed and well-nourished. No distress.   HENT:   Nose: Rhinorrhea present. No mucosal edema. Right sinus exhibits no maxillary sinus tenderness. Left sinus exhibits no maxillary sinus tenderness.   Eyes: EOM are normal. Pupils are equal, round, and reactive to light.   Neck: Neck supple.   Normal range of motion.  Cardiovascular:  Normal rate and regular rhythm.     Exam reveals no gallop and no friction rub.       No murmur heard.  Pulmonary/Chest: Breath sounds normal. No respiratory distress.   Abdominal: Abdomen is soft. Bowel sounds are normal. There is no abdominal tenderness. There is no rebound and no guarding.   Musculoskeletal:         General: Normal range of motion.      Cervical back: Normal range of motion and neck supple.     Neurological: She is alert and oriented to person, place, and time.   Skin: Skin is warm and dry.   Psychiatric: She has a normal mood and affect. Her behavior is normal. Judgment and thought content normal.         ED Course   Procedures  Labs Reviewed   RAPID INFLUENZA A/B - Normal       Result Value    Influenza A Negative      Influenza B Negative     THROAT SCREEN, RAPID STREP - Normal    Rapid Group A Strep Negative     SARS-COV-2 RNA AMPLIFICATION, QUAL - Normal    SARS COV-2 Molecular Negative      Narrative:     The IDNOW COVID-19 assay is a rapid molecular in vitro diagnostic test utilizing an isothermal nucleic acid amplification technology intended for the qualitative detection of nucleic acid from the SARS-CoV-2 viral RNA in direct nasal, nasopharyngeal or throat swabs from individuals who are suspected of COVID-19 by  their healthcare provider.          Imaging Results    None          Medications   ketorolac injection 60 mg (60 mg Intramuscular Given 8/17/24 1928)   ondansetron disintegrating tablet 8 mg (8 mg Oral Given 8/17/24 1931)     Medical Decision Making  Risk  Prescription drug management.               ED Course as of 08/17/24 2000   Sat Aug 17, 2024   1944 Throat Screen, Rapid Strep  Strep negative  [HB]   1958 COVID-19 Rapid Screening  Covid negative   [HB]   1958 Rapid Influenza A/B  Flu negative     [HB]      ED Course User Index  [HB] CECILE Osborn FNP-C                           Clinical Impression:  Final diagnoses:  [J06.9] Upper respiratory tract infection, unspecified type (Primary)          ED Disposition Condition    Discharge Stable          ED Prescriptions    None       Follow-up Information       Follow up With Specialties Details Why Contact Info    Sally Nobles, NP Family Medicine  As needed [address] [facility] [address] [phone]      [facility] Emergency Medicine  If symptoms worsen [address] [facility] [address] [phone]             CECILE Osborn FNP-C  08/17/24 2000

## 2024-10-15 ENCOUNTER — OFFICE VISIT (OUTPATIENT)
Dept: OBSTETRICS AND GYNECOLOGY | Facility: CLINIC | Age: 24
End: 2024-10-15
Payer: COMMERCIAL

## 2024-10-15 VITALS
BODY MASS INDEX: 31.84 KG/M2 | SYSTOLIC BLOOD PRESSURE: 116 MMHG | WEIGHT: 162.19 LBS | TEMPERATURE: 98 F | DIASTOLIC BLOOD PRESSURE: 80 MMHG | HEIGHT: 60 IN

## 2024-10-15 DIAGNOSIS — Z30.42 ENCOUNTER FOR DEPO-PROVERA CONTRACEPTION: Primary | ICD-10-CM

## 2024-10-15 DIAGNOSIS — N92.1 BREAKTHROUGH BLEEDING ON DEPO-PROVERA: ICD-10-CM

## 2024-10-15 LAB
B-HCG UR QL: NEGATIVE
CTP QC/QA: YES

## 2024-10-15 PROCEDURE — 99213 OFFICE O/P EST LOW 20 MIN: CPT | Mod: 25,,,

## 2024-10-15 PROCEDURE — 3079F DIAST BP 80-89 MM HG: CPT | Mod: CPTII,,,

## 2024-10-15 PROCEDURE — 3008F BODY MASS INDEX DOCD: CPT | Mod: CPTII,,,

## 2024-10-15 PROCEDURE — 1159F MED LIST DOCD IN RCRD: CPT | Mod: CPTII,,,

## 2024-10-15 PROCEDURE — 96372 THER/PROPH/DIAG INJ SC/IM: CPT | Mod: ,,,

## 2024-10-15 PROCEDURE — 3074F SYST BP LT 130 MM HG: CPT | Mod: CPTII,,,

## 2024-10-15 PROCEDURE — 81025 URINE PREGNANCY TEST: CPT | Mod: ,,,

## 2024-10-15 PROCEDURE — 1160F RVW MEDS BY RX/DR IN RCRD: CPT | Mod: CPTII,,,

## 2024-10-15 RX ORDER — MEDROXYPROGESTERONE ACETATE 150 MG/ML
150 INJECTION, SUSPENSION INTRAMUSCULAR
Status: COMPLETED | OUTPATIENT
Start: 2024-10-15 | End: 2024-10-15

## 2024-10-15 RX ADMIN — MEDROXYPROGESTERONE ACETATE 150 MG: 150 INJECTION, SUSPENSION INTRAMUSCULAR at 02:10

## 2024-10-15 NOTE — PROGRESS NOTES
Subjective     Patient ID: Abhay Schilling is a 24 y.o. female.    Chief Complaint:  Depo-Provera Contraceptive Status (Pt states that she have been bleeding for x4 weeks. -UPT)      History of Present Illness  HPI  Abhay Schilling 24 y.o. Black or  female  presents to clinic fr her DMPA. Last injection was 24. Pt states she has been bleeding on and off for the past 4 weeks.       GYN & OB History  No LMP recorded.   Date of Last Pap: 2024    OB History    Para Term  AB Living   1 1   1   1   SAB IAB Ectopic Multiple Live Births           1      # Outcome Date GA Lbr Gabriel/2nd Weight Sex Type Anes PTL Lv   1  24 36w0d  2.551 kg (5 lb 10 oz) M CS-LTranv Spinal  STACEY      Complications: Fetal Intolerance       Review of Systems  Review of Systems   Constitutional: Negative.    HENT: Negative.     Eyes: Negative.    Respiratory: Negative.     Cardiovascular: Negative.    Gastrointestinal: Negative.    Endocrine: Negative.    Genitourinary:  Positive for menstrual problem.   Musculoskeletal: Negative.    Integumentary:  Negative.   Neurological: Negative.    Hematological: Negative.    Psychiatric/Behavioral: Negative.     Breast: negative.           Objective   /80   Temp 97.9 °F (36.6 °C) (Temporal)   Ht 5' (1.524 m)   Wt 73.6 kg (162 lb 3.2 oz)   BMI 31.68 kg/m²     Physical Exam:   Constitutional: She is oriented to person, place, and time. She appears well-developed and well-nourished.    HENT:   Head: Normocephalic.    Eyes: Pupils are equal, round, and reactive to light. EOM are normal.     Cardiovascular:  Normal rate.             Pulmonary/Chest: Effort normal.        Abdominal: Soft.     Genitourinary:    Inguinal canal, urethra and rectum normal.      Pelvic exam was performed with patient in the lithotomy position.   The external female genitalia was normal.     Labial bartholins normal.There is bleeding in the vagina. Vagina was  moist.Normal urethral meatus.          Musculoskeletal: Normal range of motion.       Neurological: She is alert and oriented to person, place, and time.    Skin: Skin is warm and dry.    Psychiatric: She has a normal mood and affect.            Assessment and Plan     1. Encounter for Depo-Provera contraception    2. Breakthrough bleeding on Depo-Provera             Plan:  One swab with leuk, myco, urea, AV, and BV panels    Patient desires depo-provera for contraceptive management  Patient tolerated injection  Bleeding precautions    Discussed that birth control does not protect against STDs  Follow up in 3mos    If BTB continues we will do 2 months of OCP to control bleeding.     RTC in 3 months for next DMPA.

## 2024-10-18 ENCOUNTER — TELEPHONE (OUTPATIENT)
Dept: OBSTETRICS AND GYNECOLOGY | Facility: CLINIC | Age: 24
End: 2024-10-18
Payer: COMMERCIAL

## 2024-10-18 DIAGNOSIS — N73.9 PELVIC INFLAMMATORY DISEASE DUE TO MYCOPLASMA HOMINIS: Primary | ICD-10-CM

## 2024-10-18 DIAGNOSIS — B96.89 PELVIC INFLAMMATORY DISEASE DUE TO MYCOPLASMA HOMINIS: Primary | ICD-10-CM

## 2024-10-18 RX ORDER — MOXIFLOXACIN HYDROCHLORIDE 400 MG/1
400 TABLET ORAL DAILY
Qty: 7 TABLET | Refills: 0 | Status: SHIPPED | OUTPATIENT
Start: 2024-10-18 | End: 2024-10-25

## 2024-10-18 RX ORDER — DOXYCYCLINE 100 MG/1
100 CAPSULE ORAL 2 TIMES DAILY
Qty: 14 CAPSULE | Refills: 0 | Status: SHIPPED | OUTPATIENT
Start: 2024-10-18 | End: 2024-10-25

## 2024-10-18 NOTE — TELEPHONE ENCOUNTER
----- Message from GEORGIA Lane sent at 10/18/2024 12:51 PM CDT -----  Please notify pt of +MH on one swab and treat with doxycycline 100mg po BID x7 days followed by Moxifloxacin 400mg po daily x7 days.    Please recommend a daily probiotic for a minimum of 6 months d/t absence of healthy bacteria.  Thanks!

## 2024-12-04 ENCOUNTER — HOSPITAL ENCOUNTER (EMERGENCY)
Facility: HOSPITAL | Age: 24
Discharge: HOME | End: 2024-12-04
Payer: COMMERCIAL

## 2024-12-04 VITALS
SYSTOLIC BLOOD PRESSURE: 128 MMHG | HEART RATE: 101 BPM | DIASTOLIC BLOOD PRESSURE: 87 MMHG | TEMPERATURE: 99 F | HEIGHT: 60 IN | WEIGHT: 162 LBS | BODY MASS INDEX: 31.8 KG/M2 | RESPIRATION RATE: 18 BRPM | OXYGEN SATURATION: 100 %

## 2024-12-04 DIAGNOSIS — L03.119 CELLULITIS AND ABSCESS OF UPPER EXTREMITY: Primary | ICD-10-CM

## 2024-12-04 DIAGNOSIS — L02.419 CELLULITIS AND ABSCESS OF UPPER EXTREMITY: Primary | ICD-10-CM

## 2024-12-04 DIAGNOSIS — S50.862A INSECT BITE OF LEFT FOREARM, INITIAL ENCOUNTER: ICD-10-CM
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
## 2024-12-04 PROCEDURE — 63600175 PHARM REV CODE 636 W HCPCS: Performed by: NURSE PRACTITIONER

## 2024-12-04 PROCEDURE — 96372 THER/PROPH/DIAG INJ SC/IM: CPT | Performed by: NURSE PRACTITIONER

## 2024-12-04 PROCEDURE — 99284 EMERGENCY DEPT VISIT MOD MDM: CPT | Mod: 25

## 2024-12-04 RX ORDER — DEXAMETHASONE SODIUM PHOSPHATE 4 MG/ML
8 INJECTION, SOLUTION INTRA-ARTICULAR; INTRALESIONAL; INTRAMUSCULAR; INTRAVENOUS; SOFT TISSUE
Status: COMPLETED | OUTPATIENT
Start: 2024-12-04 | End: 2024-12-04

## 2024-12-04 RX ORDER — CEPHALEXIN 500 MG/1
500 CAPSULE ORAL 4 TIMES DAILY
Qty: 40 CAPSULE | Refills: 0 | Status: SHIPPED | OUTPATIENT
Start: 2024-12-04 | End: 2024-12-14

## 2024-12-04 RX ADMIN — DEXAMETHASONE SODIUM PHOSPHATE 8 MG: 4 INJECTION, SOLUTION INTRA-ARTICULAR; INTRALESIONAL; INTRAMUSCULAR; INTRAVENOUS; SOFT TISSUE at 01:12

## 2024-12-04 NOTE — Clinical Note
"Abhay Villagomezlisbet" Shakir was seen and treated in our emergency department on 12/4/2024.  She may return to work on 12/05/2024.       If you have any questions or concerns, please don't hesitate to call.      Enedina Deshpande, [place]"

## 2024-12-04 NOTE — ED PROVIDER NOTES
"Encounter Date: 2024       History     Chief Complaint   Patient presents with    Insect Bite     Pt presented to ED per POV with c/o left arm redness and pain. Pt stated "I think its a spider bite".      24 year old female presents with an insect bite to the left inner forearm x3 days ago    The history is provided by the patient. No  was used.     Review of patient's allergies indicates:  No Known Allergies  Past Medical History:   Diagnosis Date    Anemia     Anxiety disorder, unspecified     Bulging disc     Heart murmur     Scoliosis      Past Surgical History:   Procedure Laterality Date     SECTION  2022    CHOLECYSTECTOMY  2022     Family History   Problem Relation Name Age of Onset    Hypertension Mother      Breast cancer Maternal Aunt          age unknown    Colon cancer Neg Hx      Ovarian cancer Neg Hx      Uterine cancer Neg Hx       Social History     Tobacco Use    Smoking status: Never     Passive exposure: Never    Smokeless tobacco: Never   Substance Use Topics    Alcohol use: Not Currently    Drug use: Not Currently     Review of Systems   Skin:  Positive for wound.   All other systems reviewed and are negative.      Physical Exam     Initial Vitals [24 1313]   BP Pulse Resp Temp SpO2   128/87 101 18 98.6 °F (37 °C) 100 %      MAP       --         Physical Exam    Nursing note and vitals reviewed.  Constitutional: She appears well-developed and well-nourished.   HENT:   Head: Normocephalic and atraumatic.   Eyes: Conjunctivae and EOM are normal. Pupils are equal, round, and reactive to light.   Neck: Neck supple.   Normal range of motion.  Cardiovascular:  Normal rate, regular rhythm, normal heart sounds and intact distal pulses.           Pulmonary/Chest: Breath sounds normal.   Abdominal: Abdomen is soft. Bowel sounds are normal.   Musculoskeletal:         General: Normal range of motion.      Cervical back: Normal range of motion and neck " supple.     Neurological: She is alert and oriented to person, place, and time. She has normal strength.   Skin: Skin is warm and dry. Capillary refill takes less than 2 seconds. Abscess noted.        Psychiatric: She has a normal mood and affect. Her behavior is normal. Judgment and thought content normal.         ED Course   Procedures  Labs Reviewed - No data to display       Imaging Results    None          Medications   dexAMETHasone injection 8 mg (has no administration in time range)     Medical Decision Making  Problems Addressed:  Cellulitis and abscess of upper extremity: acute illness or injury  Insect bite of left forearm, initial encounter: acute illness or injury    Risk  Prescription drug management.                                      Clinical Impression:  Final diagnoses:  [L03.119, L02.419] Cellulitis and abscess of upper extremity (Primary)  [S50.862A, W57.XXXA] Insect bite of left forearm, initial encounter          ED Disposition Condition    Discharge Stable          ED Prescriptions       Medication Sig Dispense Start Date End Date Auth. Provider    cephALEXin (KEFLEX) 500 MG capsule Take 1 capsule (500 mg total) by mouth 4 (four) times daily. for 10 days 40 capsule 12/4/2024 12/14/2024 Enedina Deshpande FNP          Follow-up Information       Follow up With Specialties Details Why Contact Info    Sally Nobles NP Family Medicine In 3 days  [address] [facility] [address] [phone]               Enedina Deshpande FNP  12/04/24 8284

## 2025-01-03 ENCOUNTER — HOSPITAL ENCOUNTER (EMERGENCY)
Facility: HOSPITAL | Age: 25
Discharge: HOME | End: 2025-01-03
Attending: FAMILY MEDICINE
Payer: COMMERCIAL

## 2025-01-03 VITALS
TEMPERATURE: 98 F | OXYGEN SATURATION: 100 % | WEIGHT: 163 LBS | BODY MASS INDEX: 32 KG/M2 | DIASTOLIC BLOOD PRESSURE: 87 MMHG | RESPIRATION RATE: 18 BRPM | HEART RATE: 93 BPM | SYSTOLIC BLOOD PRESSURE: 138 MMHG | HEIGHT: 60 IN

## 2025-01-03 DIAGNOSIS — B34.9 VIRAL SYNDROME: ICD-10-CM

## 2025-01-03 DIAGNOSIS — J02.9 SORE THROAT: Primary | ICD-10-CM

## 2025-01-03 LAB
INFLUENZA A (OHS): NEGATIVE
INFLUENZA B (OHS): NEGATIVE
RSV ANTIGEN (OHS): NEGATIVE
SARS-COV-2 RDRP RESP QL NAA+PROBE: NEGATIVE

## 2025-01-03 PROCEDURE — U0002 COVID-19 LAB TEST NON-CDC: HCPCS | Performed by: FAMILY MEDICINE

## 2025-01-03 PROCEDURE — 87400 INFLUENZA A/B EACH AG IA: CPT | Performed by: FAMILY MEDICINE

## 2025-01-03 PROCEDURE — 87807 RSV ASSAY W/OPTIC: CPT | Performed by: FAMILY MEDICINE

## 2025-01-03 PROCEDURE — 99284 EMERGENCY DEPT VISIT MOD MDM: CPT

## 2025-01-03 RX ORDER — AMOXICILLIN 500 MG/1
500 CAPSULE ORAL 3 TIMES DAILY
Qty: 21 CAPSULE | Refills: 0 | Status: SHIPPED | OUTPATIENT
Start: 2025-01-03 | End: 2025-01-10

## 2025-01-03 RX ORDER — PROMETHAZINE HYDROCHLORIDE AND DEXTROMETHORPHAN HYDROBROMIDE 6.25; 15 MG/5ML; MG/5ML
7.5 SYRUP ORAL EVERY 6 HOURS PRN
Qty: 150 ML | Refills: 0 | Status: SHIPPED | OUTPATIENT
Start: 2025-01-03 | End: 2025-01-13

## 2025-01-03 NOTE — Clinical Note
"Abhay Scihlling (Jadalen) was seen and treated in our emergency department on 1/3/2025.  She may return to work on 01/05/2025.       If you have any questions or concerns, please don't hesitate to call.       RN    "

## 2025-01-03 NOTE — Clinical Note
"Abhay Schilling (Jadalen) was seen and treated in our emergency department on 1/3/2025.  She may return to work on 01/05/2025.       If you have any questions or concerns, please don't hesitate to call.       RN    "

## 2025-01-03 NOTE — ED PROVIDER NOTES
Encounter Date: 1/3/2025       History     Chief Complaint   Patient presents with    Cough     PT C/O cough, congestion, and body aches onset yesterday.      This is a 24-year-old  female who presents with 1 day history of body aches, sore throat, nasal congestion, fever, chills, headache, chest pain with cough, and cough of productive yellow sputum he has past medical history of anemia, anxiety, bulging disc, heart murmur and scoliosis.    The history is provided by the patient.     Review of patient's allergies indicates:  No Known Allergies  Past Medical History:   Diagnosis Date    Anemia     Anxiety disorder, unspecified     Bulging disc     Heart murmur     Scoliosis      Past Surgical History:   Procedure Laterality Date     SECTION  2022    CHOLECYSTECTOMY  2022     Family History   Problem Relation Name Age of Onset    Hypertension Mother      Breast cancer Maternal Aunt          age unknown    Colon cancer Neg Hx      Ovarian cancer Neg Hx      Uterine cancer Neg Hx       Social History     Tobacco Use    Smoking status: Never     Passive exposure: Never    Smokeless tobacco: Never   Substance Use Topics    Alcohol use: Not Currently    Drug use: Not Currently     Review of Systems   Constitutional:  Positive for chills and fever.   HENT:  Positive for congestion, rhinorrhea, sinus pressure, sinus pain, sore throat and trouble swallowing.    Respiratory:  Positive for cough.    Cardiovascular:  Positive for chest pain.   Musculoskeletal:  Positive for arthralgias.   Neurological:  Positive for headaches.       Physical Exam     Initial Vitals [25 0746]   BP Pulse Resp Temp SpO2   117/89 84 18 98.4 °F (36.9 °C) 99 %      MAP       --         Physical Exam    Nursing note and vitals reviewed.  Constitutional: She appears well-developed and well-nourished.   HENT:   Head: Normocephalic and atraumatic.   Eyes: Pupils are equal, round, and reactive to light.   Neck:  Neck supple.   Normal range of motion.  Cardiovascular:  Normal rate, regular rhythm and normal heart sounds.           Pulmonary/Chest: Breath sounds normal.   Abdominal: Abdomen is soft. Bowel sounds are normal.   Musculoskeletal:         General: Normal range of motion.      Cervical back: Normal range of motion and neck supple.     Neurological: She is alert and oriented to person, place, and time.         ED Course   Procedures  Labs Reviewed   RAPID INFLUENZA A/B - Normal       Result Value    Influenza A Negative      Influenza B Negative     SARS-COV-2 RNA AMPLIFICATION, QUAL - Normal    SARS COV-2 Molecular Negative      Narrative:     The IDNOW COVID-19 assay is a rapid molecular in vitro diagnostic test utilizing an isothermal nucleic acid amplification technology intended for the qualitative detection of nucleic acid from the SARS-CoV-2 viral RNA in direct nasal, nasopharyngeal or throat swabs from individuals who are suspected of COVID-19 by their healthcare provider.   RAPID RSV - Normal    RSV Negative            Imaging Results    None          Medications - No data to display  Medical Decision Making  Differential diagnosis:  Influenza, COVID, RSV, common cold, viral upper respiratory infection, viral syndrome                                      Clinical Impression:  Final diagnoses:  [J02.9] Sore throat (Primary)  [B34.9] Viral syndrome          ED Disposition Condition    Discharge Stable          ED Prescriptions       Medication Sig Dispense Start Date End Date Auth. Provider    amoxicillin (AMOXIL) 500 MG capsule Take 1 capsule (500 mg total) by mouth 3 (three) times daily. for 7 days 21 capsule 1/3/2025 1/10/2025 Gucci Barrera MD    promethazine-dextromethorphan (PROMETHAZINE-DM) 6.25-15 mg/5 mL Syrp Take 7.5 mLs by mouth every 6 (six) hours as needed. 150 mL 1/3/2025 1/13/2025 Gucci Barrera MD          Follow-up Information       Follow up With Specialties Details Why Contact  Info    Sally Nobles, NP Family Medicine In 3 days  [address] [facility] [address]  [phone]               Gucci Barrera MD  01/03/25 0945

## 2025-01-19 ENCOUNTER — HOSPITAL ENCOUNTER (EMERGENCY)
Facility: HOSPITAL | Age: 25
Discharge: HOME | End: 2025-01-19
Attending: FAMILY MEDICINE
Payer: COMMERCIAL

## 2025-01-19 VITALS
OXYGEN SATURATION: 98 % | HEART RATE: 116 BPM | BODY MASS INDEX: 30.21 KG/M2 | HEIGHT: 61 IN | SYSTOLIC BLOOD PRESSURE: 114 MMHG | RESPIRATION RATE: 18 BRPM | DIASTOLIC BLOOD PRESSURE: 65 MMHG | TEMPERATURE: 99 F | WEIGHT: 160 LBS

## 2025-01-19 DIAGNOSIS — N39.0 URINARY TRACT INFECTION WITHOUT HEMATURIA, SITE UNSPECIFIED: ICD-10-CM

## 2025-01-19 DIAGNOSIS — J10.1 INFLUENZA A: Primary | ICD-10-CM

## 2025-01-19 LAB
ALBUMIN SERPL-MCNC: 4.6 G/DL (ref 3.4–5)
ALBUMIN/GLOB SERPL: 1.4 RATIO
ALP SERPL-CCNC: 97 UNIT/L (ref 50–144)
ALT SERPL-CCNC: 20 UNIT/L (ref 1–45)
ANION GAP SERPL CALC-SCNC: 7 MEQ/L (ref 2–13)
AST SERPL-CCNC: 22 UNIT/L (ref 14–36)
BACTERIA #/AREA URNS AUTO: ABNORMAL /HPF
BASOPHILS # BLD AUTO: 0.05 X10(3)/MCL (ref 0.01–0.08)
BASOPHILS NFR BLD AUTO: 0.6 % (ref 0.1–1.2)
BILIRUB SERPL-MCNC: 0.6 MG/DL (ref 0–1)
BILIRUB UR QL STRIP.AUTO: ABNORMAL
BUN SERPL-MCNC: 10 MG/DL (ref 7–20)
CALCIUM SERPL-MCNC: 9.2 MG/DL (ref 8.4–10.2)
CHLORIDE SERPL-SCNC: 109 MMOL/L (ref 98–110)
CLARITY UR: CLEAR
CO2 SERPL-SCNC: 23 MMOL/L (ref 21–32)
COLOR UR AUTO: YELLOW
CREAT SERPL-MCNC: 0.63 MG/DL (ref 0.66–1.25)
CREAT/UREA NIT SERPL: 16 (ref 12–20)
EOSINOPHIL # BLD AUTO: 0.03 X10(3)/MCL (ref 0.04–0.36)
EOSINOPHIL NFR BLD AUTO: 0.3 % (ref 0.7–7)
ERYTHROCYTE [DISTWIDTH] IN BLOOD BY AUTOMATED COUNT: 13 % (ref 11–14.5)
GFR SERPLBLD CREATININE-BSD FMLA CKD-EPI: >90 ML/MIN/1.73/M2
GLOBULIN SER-MCNC: 3.3 GM/DL (ref 2–3.9)
GLUCOSE SERPL-MCNC: 96 MG/DL (ref 70–115)
GLUCOSE UR QL STRIP: NEGATIVE
HCT VFR BLD AUTO: 41.6 % (ref 36–48)
HGB BLD-MCNC: 13.4 G/DL (ref 11.8–16)
HGB UR QL STRIP: ABNORMAL
IMM GRANULOCYTES # BLD AUTO: 0.03 X10(3)/MCL (ref 0–0.03)
IMM GRANULOCYTES NFR BLD AUTO: 0.3 % (ref 0–0.5)
INFLUENZA A (OHS): POSITIVE
INFLUENZA B (OHS): NEGATIVE
KETONES UR QL STRIP: >=80
LEUKOCYTE ESTERASE UR QL STRIP: NEGATIVE
LYMPHOCYTES # BLD AUTO: 0.26 X10(3)/MCL (ref 1.16–3.74)
LYMPHOCYTES NFR BLD AUTO: 3 % (ref 20–55)
MCH RBC QN AUTO: 26.1 PG (ref 27–34)
MCHC RBC AUTO-ENTMCNC: 32.2 G/DL (ref 31–37)
MCV RBC AUTO: 80.9 FL (ref 79–99)
MONOCYTES # BLD AUTO: 0.42 X10(3)/MCL (ref 0.24–0.36)
MONOCYTES NFR BLD AUTO: 4.8 % (ref 4.7–12.5)
MUCOUS THREADS URNS QL MICRO: ABNORMAL /LPF
NEUTROPHILS # BLD AUTO: 8.01 X10(3)/MCL (ref 1.56–6.13)
NEUTROPHILS NFR BLD AUTO: 91 % (ref 37–73)
NITRITE UR QL STRIP: NEGATIVE
NRBC BLD AUTO-RTO: 0 %
PH UR STRIP: 6 [PH]
PLATELET # BLD AUTO: 200 X10(3)/MCL (ref 140–371)
PMV BLD AUTO: 9.6 FL (ref 9.4–12.4)
POTASSIUM SERPL-SCNC: 3.5 MMOL/L (ref 3.5–5.1)
PROT SERPL-MCNC: 7.9 GM/DL (ref 6.3–8.2)
PROT UR QL STRIP: 100
RBC # BLD AUTO: 5.14 X10(6)/MCL (ref 4–5.1)
RBC #/AREA URNS AUTO: ABNORMAL /HPF
SARS-COV-2 RDRP RESP QL NAA+PROBE: NEGATIVE
SODIUM SERPL-SCNC: 139 MMOL/L (ref 136–145)
SP GR UR STRIP.AUTO: 1.02 (ref 1–1.03)
SQUAMOUS #/AREA URNS AUTO: ABNORMAL /HPF
UROBILINOGEN UR STRIP-ACNC: 0.2
WBC # BLD AUTO: 8.8 X10(3)/MCL (ref 4–11.5)
WBC #/AREA URNS AUTO: ABNORMAL /HPF

## 2025-01-19 PROCEDURE — 63600175 PHARM REV CODE 636 W HCPCS: Mod: JZ,TB

## 2025-01-19 PROCEDURE — 96372 THER/PROPH/DIAG INJ SC/IM: CPT

## 2025-01-19 PROCEDURE — U0002 COVID-19 LAB TEST NON-CDC: HCPCS

## 2025-01-19 PROCEDURE — 81015 MICROSCOPIC EXAM OF URINE: CPT

## 2025-01-19 PROCEDURE — 25000003 PHARM REV CODE 250

## 2025-01-19 PROCEDURE — 99284 EMERGENCY DEPT VISIT MOD MDM: CPT | Mod: 25

## 2025-01-19 PROCEDURE — 87400 INFLUENZA A/B EACH AG IA: CPT

## 2025-01-19 PROCEDURE — 85025 COMPLETE CBC W/AUTO DIFF WBC: CPT

## 2025-01-19 PROCEDURE — 81003 URINALYSIS AUTO W/O SCOPE: CPT

## 2025-01-19 PROCEDURE — 80053 COMPREHEN METABOLIC PANEL: CPT

## 2025-01-19 RX ORDER — NITROFURANTOIN 25; 75 MG/1; MG/1
100 CAPSULE ORAL 2 TIMES DAILY
Qty: 10 CAPSULE | Refills: 0 | Status: SHIPPED | OUTPATIENT
Start: 2025-01-19 | End: 2025-01-24

## 2025-01-19 RX ORDER — ACETAMINOPHEN 325 MG/1
650 TABLET ORAL
Status: COMPLETED | OUTPATIENT
Start: 2025-01-19 | End: 2025-01-19

## 2025-01-19 RX ORDER — KETOROLAC TROMETHAMINE 30 MG/ML
30 INJECTION, SOLUTION INTRAMUSCULAR; INTRAVENOUS
Status: COMPLETED | OUTPATIENT
Start: 2025-01-19 | End: 2025-01-19

## 2025-01-19 RX ORDER — OSELTAMIVIR PHOSPHATE 75 MG/1
75 CAPSULE ORAL 2 TIMES DAILY
Qty: 10 CAPSULE | Refills: 0 | Status: SHIPPED | OUTPATIENT
Start: 2025-01-19 | End: 2025-01-24

## 2025-01-19 RX ADMIN — ACETAMINOPHEN 650 MG: 325 TABLET, FILM COATED ORAL at 12:01

## 2025-01-19 RX ADMIN — KETOROLAC TROMETHAMINE 30 MG: 30 INJECTION, SOLUTION INTRAMUSCULAR at 12:01

## 2025-01-19 NOTE — ED PROVIDER NOTES
Encounter Date: 2025       History     Chief Complaint   Patient presents with    Generalized Body Aches     Headache, bodyaches started last night,  vomiting this morning,  temp on 101.9 in triage. Last took tylenol last night      See MDM.     The history is provided by the patient. No  was used.     Review of patient's allergies indicates:  No Known Allergies  Past Medical History:   Diagnosis Date    Anemia     Anxiety disorder, unspecified     Bulging disc     Heart murmur     Scoliosis      Past Surgical History:   Procedure Laterality Date     SECTION  2022    CHOLECYSTECTOMY  2022     Family History   Problem Relation Name Age of Onset    Hypertension Mother      Breast cancer Maternal Aunt          age unknown    Colon cancer Neg Hx      Ovarian cancer Neg Hx      Uterine cancer Neg Hx       Social History     Tobacco Use    Smoking status: Never     Passive exposure: Never    Smokeless tobacco: Never   Substance Use Topics    Alcohol use: Not Currently    Drug use: Not Currently     Review of Systems   Constitutional:  Positive for chills and fever.   HENT:  Positive for rhinorrhea. Negative for congestion and sore throat.    Respiratory:  Negative for cough.    Gastrointestinal:  Positive for nausea and vomiting. Negative for abdominal pain and diarrhea.   Genitourinary:  Positive for vaginal bleeding. Negative for dysuria and hematuria.   Musculoskeletal:  Positive for myalgias.   All other systems reviewed and are negative.      Physical Exam     Initial Vitals   BP Pulse Resp Temp SpO2   25 1251 25 1151 25 1151 25 1151 25 1151   112/66 (!) 127 18 (!) 101.9 °F (38.8 °C) 100 %      MAP       --                Physical Exam    Nursing note and vitals reviewed.  Constitutional: She appears well-developed and well-nourished. She is not diaphoretic. No distress.   HENT:   Head: Normocephalic and atraumatic.   Cardiovascular:  Normal  rate, regular rhythm and normal heart sounds.     Exam reveals no gallop and no friction rub.       No murmur heard.  Pulmonary/Chest: Breath sounds normal. No respiratory distress. She has no wheezes. She has no rhonchi. She has no rales.   Abdominal: Abdomen is soft. Bowel sounds are normal. She exhibits no distension and no mass. There is no abdominal tenderness. There is no rebound and no guarding.   Musculoskeletal:         General: Normal range of motion.     Neurological: She is alert and oriented to person, place, and time.   Skin: Skin is warm and dry.   Psychiatric: She has a normal mood and affect. Her behavior is normal.         ED Course   Procedures  Labs Reviewed   RAPID INFLUENZA A/B - Abnormal       Result Value    Influenza A Positive (*)     Influenza B Negative     COMPREHENSIVE METABOLIC PANEL - Abnormal    Sodium 139      Potassium 3.5      Chloride 109      CO2 23      Glucose 96      Blood Urea Nitrogen 10      Creatinine 0.63 (*)     Calcium 9.2      Protein Total 7.9      Albumin 4.6      Globulin 3.3      Albumin/Globulin Ratio 1.4      Bilirubin Total 0.6      ALP 97      ALT 20      AST 22      eGFR >90      Anion Gap 7.0      BUN/Creatinine Ratio 16     URINALYSIS, REFLEX TO URINE CULTURE - Abnormal    Color, UA Yellow      Appearance, UA Clear      Specific Gravity, UA 1.025      pH, UA 6.0      Protein,  (*)     Glucose, UA Negative      Ketones, UA >=80 (*)     Blood, UA Large (*)     Bilirubin, UA Moderate (*)     Urobilinogen, UA 0.2      Nitrites, UA Negative      Leukocyte Esterase, UA Negative      Narrative:      URINE STABILITY IS 2 HOURS AT ROOM TEMP OR    SIX HOURS REFRIGERATED. PERFORMING TESTING ON    SPECIMENS GREATER THAN THIS AGE MAY AFFECT THE    FOLLOWING TESTS:    PH          SPECIFIC GRAVITY           BLOOD    CLARITY     BILIRUBIN               UROBILINOGEN   CBC WITH DIFFERENTIAL - Abnormal    WBC 8.80      RBC 5.14 (*)     Hgb 13.4      Hct 41.6      MCV  80.9      MCH 26.1 (*)     MCHC 32.2      RDW 13.0      Platelet 200      MPV 9.6      Neut % 91.0 (*)     Lymph % 3.0 (*)     Mono % 4.8      Eos % 0.3 (*)     Basophil % 0.6      Lymph # 0.26 (*)     Neut # 8.01 (*)     Mono # 0.42 (*)     Eos # 0.03 (*)     Baso # 0.05      Imm Gran # 0.03      Imm Grans % 0.3      NRBC% 0.0     URINALYSIS, MICROSCOPIC - Abnormal    Bacteria, UA Few (*)     Mucous, UA Many (*)     RBC, UA 0-5      WBC, UA 6-10 (*)     Squamous Epithelial Cells, UA Many (*)    SARS-COV-2 RNA AMPLIFICATION, QUAL - Normal    SARS COV-2 Molecular Negative      Narrative:     The IDNOW COVID-19 assay is a rapid molecular in vitro diagnostic test utilizing an isothermal nucleic acid amplification technology intended for the qualitative detection of nucleic acid from the SARS-CoV-2 viral RNA in direct nasal, nasopharyngeal or throat swabs from individuals who are suspected of COVID-19 by their healthcare provider.   CULTURE, URINE   CBC W/ AUTO DIFFERENTIAL    Narrative:     The following orders were created for panel order CBC auto differential.  Procedure                               Abnormality         Status                     ---------                               -----------         ------                     CBC with Differential[[id number]]       Abnormal            Final result                 Please view results for these tests on the individual orders.          Imaging Results    None          Medications   acetaminophen tablet 650 mg (650 mg Oral Given 1/19/25 1217)   ketorolac injection 30 mg (30 mg Intramuscular Given 1/19/25 1216)     Medical Decision Making  Pt is a 25 y/o female who presents with generalized body aches and chills since yesterday. Has not taken temperature at home, attempted to take tylenol at home last night but vomitted it up. Had another episode of dry heaving and nausea this morning. Has not attempted PO today. Body aches are a 10/10. Notes that she has also had  vaginal bleeding x3 weeks, was seen by her gynecologist for this problem and have plan to switch birth control, has not made this change yet. Pt notes that she is saturating 6-7 pads daily, not using tampons, currently wearing a pull up for the bleeding. Denies vaginal pain or other discharge. Does have some associated mild cramping that feels like a period cramp. Denies other abdominal pain/pelvic pain. Denies diarrhea, dysuria, hematuria. No known sick contacts.     Pt not toxic appearing, no acute distress. Fever and body aches resolved in ED with toradol and tylenol. Positive influenza A. Negative covid. No leukocytosis or anemia. CMP unremarkable. Creatinine 0.63, BUN 10. UA shows no leuks or nitrites but 6-10 WBCs. Will cover for bacteruria. Instructed pt to stay well hydrated and follow-up with gynecology for further evaluation of vaginal bleeding. Strict ED precautions given. Pt expressed understanding and was in agreement with the plan.       Amount and/or Complexity of Data Reviewed  Labs: ordered. Decision-making details documented in ED Course.    Risk  OTC drugs.  Prescription drug management.      Additional MDM:   Differential Diagnosis:   Other: The following diagnoses were also considered and will be evaluated: covid, influenza and viral uri.            ED Course as of 01/19/25 1437   Sun Jan 19, 2025   1230 Influenza A(!): Positive [ME]   1230 Influenza B, Molecular: Negative [ME]   1230 SARS COV-2 MOLECULAR: Negative [ME]   1250 WBC: 8.80 [ME]   1250 Hemoglobin: 13.4 [ME]   1250 Hematocrit: 41.6 [ME]   1250 Immature Grans (Abs): 0.03 [ME]   1250 Immature Granulocytes: 0.3 [ME]   1306 Sodium: 139 [ME]   1306 Potassium: 3.5 [ME]   1306 Chloride: 109 [ME]   1306 CO2: 23 [ME]   1306 BUN: 10 [ME]   1306 Creatinine(!): 0.63 [ME]   1306 eGFR: >90 [ME]   1306 Anion Gap: 7.0 [ME]   1416 NITRITE UA: Negative [ME]   1416 Leukocyte Esterase, UA: Negative [ME]   1416 Blood, UA(!): Large [ME]   1416 Glucose,  UA: Negative [ME]   1416 Ketones, UA(!): >=80 [ME]   1416 Protein, UA(!): 100 [ME]   1433 WBC, UA(!): 6-10 [ME]      ED Course User Index  [ME] Ting Albarado PA                           Clinical Impression:  Final diagnoses:  [J10.1] Influenza A (Primary)  [N39.0] Urinary tract infection without hematuria, site unspecified          ED Disposition Condition    Discharge Stable          ED Prescriptions       Medication Sig Dispense Start Date End Date Auth. Provider    nitrofurantoin, macrocrystal-monohydrate, (MACROBID) 100 MG capsule Take 1 capsule (100 mg total) by mouth 2 (two) times daily. for 5 days 10 capsule 1/19/2025 1/24/2025 Ting Albarado PA    oseltamivir (TAMIFLU) 75 MG capsule Take 1 capsule (75 mg total) by mouth 2 (two) times daily. for 5 days 10 capsule 1/19/2025 1/24/2025 Ting Albarado PA          Follow-up Information       Follow up With Specialties Details Why Contact Info    Sally Nobles NP Family Medicine Call in 1 week  [address] [facility] [id number] [phone]               Ting Albarado PA  01/19/25 0279

## 2025-01-19 NOTE — Clinical Note
"Abhay Villagomezlisbet" Shakir was seen and treated in our emergency department on 1/19/2025.  She may return to work on 01/21/2025.       If you have any questions or concerns, please don't hesitate to call.      Ting Albarado PA"

## 2025-02-05 ENCOUNTER — PATIENT MESSAGE (OUTPATIENT)
Dept: OBSTETRICS AND GYNECOLOGY | Facility: CLINIC | Age: 25
End: 2025-02-05
Payer: COMMERCIAL

## 2025-02-05 DIAGNOSIS — N92.1 MENOMETRORRHAGIA: Primary | ICD-10-CM

## 2025-02-06 ENCOUNTER — TELEPHONE (OUTPATIENT)
Dept: OBSTETRICS AND GYNECOLOGY | Facility: CLINIC | Age: 25
End: 2025-02-06
Payer: COMMERCIAL

## 2025-02-06 RX ORDER — MEDROXYPROGESTERONE ACETATE 10 MG/1
10 TABLET ORAL DAILY
Qty: 10 TABLET | Refills: 0 | Status: SHIPPED | OUTPATIENT
Start: 2025-02-06 | End: 2026-02-06

## 2025-02-24 ENCOUNTER — HOSPITAL ENCOUNTER (EMERGENCY)
Facility: HOSPITAL | Age: 25
Discharge: HOME | End: 2025-02-24
Payer: COMMERCIAL

## 2025-02-24 VITALS
OXYGEN SATURATION: 99 % | TEMPERATURE: 99 F | BODY MASS INDEX: 32.2 KG/M2 | DIASTOLIC BLOOD PRESSURE: 80 MMHG | RESPIRATION RATE: 19 BRPM | SYSTOLIC BLOOD PRESSURE: 118 MMHG | HEIGHT: 60 IN | WEIGHT: 164 LBS | HEART RATE: 103 BPM

## 2025-02-24 DIAGNOSIS — R11.2 NAUSEA AND VOMITING, UNSPECIFIED VOMITING TYPE: Primary | ICD-10-CM

## 2025-02-24 DIAGNOSIS — R10.84 GENERALIZED ABDOMINAL PAIN: ICD-10-CM

## 2025-02-24 LAB
ABS NEUT CALC (OHS): 19.54 X10(3)/MCL (ref 2.1–9.2)
AMPHET UR QL SCN: NEGATIVE
AMYLASE SERPL-CCNC: 84 UNIT/L (ref 30–100)
ANION GAP SERPL CALC-SCNC: 16 MEQ/L (ref 2–13)
B-HCG FREE SERPL-ACNC: <2.39 MIU/ML
BARBITURATE SCN PRESENT UR: NEGATIVE
BENZODIAZ UR QL SCN: NEGATIVE
BILIRUB UR QL STRIP.AUTO: NEGATIVE
BUN SERPL-MCNC: 17 MG/DL (ref 7–20)
CALCIUM SERPL-MCNC: 9.4 MG/DL (ref 8.4–10.2)
CANNABINOIDS UR QL SCN: NEGATIVE
CHLORIDE SERPL-SCNC: 108 MMOL/L (ref 98–110)
CLARITY UR: CLEAR
CO2 SERPL-SCNC: 18 MMOL/L (ref 21–32)
COCAINE UR QL SCN: NEGATIVE
COLOR UR AUTO: YELLOW
CREAT SERPL-MCNC: 0.58 MG/DL (ref 0.66–1.25)
CREAT/UREA NIT SERPL: 29 (ref 12–20)
EOSINOPHIL NFR BLD MANUAL: 0.44 X10(3)/MCL (ref 0–0.9)
EOSINOPHIL NFR BLD MANUAL: 2 % (ref 0–3)
ERYTHROCYTE [DISTWIDTH] IN BLOOD BY AUTOMATED COUNT: 12.8 % (ref 11–14.5)
GFR SERPLBLD CREATININE-BSD FMLA CKD-EPI: >90 ML/MIN/1.73/M2
GLUCOSE SERPL-MCNC: 112 MG/DL (ref 70–115)
GLUCOSE UR QL STRIP: NEGATIVE
HCT VFR BLD AUTO: 40.2 % (ref 36–48)
HGB BLD-MCNC: 12.9 G/DL (ref 11.8–16)
HGB UR QL STRIP: NEGATIVE
KETONES UR QL STRIP: >=80
LEUKOCYTE ESTERASE UR QL STRIP: NEGATIVE
LIPASE SERPL-CCNC: 54 U/L (ref 23–300)
LYMPHOCYTES NFR BLD MANUAL: 1.55 X10(3)/MCL (ref 0.6–4.6)
LYMPHOCYTES NFR BLD MANUAL: 7 % (ref 20–55)
MCH RBC QN AUTO: 25 PG (ref 27–34)
MCHC RBC AUTO-ENTMCNC: 32.1 G/DL (ref 31–37)
MCV RBC AUTO: 77.9 FL (ref 79–99)
METHADONE UR QL SCN: NEGATIVE
MONOCYTES NFR BLD MANUAL: 0.67 X10(3)/MCL (ref 0.1–1.3)
MONOCYTES NFR BLD MANUAL: 3 % (ref 0–10)
NEUTROPHILS NFR BLD MANUAL: 88 % (ref 37–73)
NITRITE UR QL STRIP: NEGATIVE
NRBC BLD AUTO-RTO: 0 %
OPIATES UR QL SCN: NEGATIVE
PCP UR QL: NEGATIVE
PH UR STRIP: 6 [PH]
PH UR: 6 [PH] (ref 5–8)
PLATELET # BLD AUTO: 282 X10(3)/MCL (ref 140–371)
PLATELET # BLD EST: NORMAL 10*3/UL
PMV BLD AUTO: 9.6 FL (ref 9.4–12.4)
POTASSIUM SERPL-SCNC: 4.4 MMOL/L (ref 3.5–5.1)
PROT UR QL STRIP: NEGATIVE
RBC # BLD AUTO: 5.16 X10(6)/MCL (ref 4–5.1)
RBC MORPH BLD: NORMAL
SODIUM SERPL-SCNC: 142 MMOL/L (ref 136–145)
SP GR UR STRIP.AUTO: 1.01 (ref 1–1.03)
UROBILINOGEN UR STRIP-ACNC: 0.2
WBC # BLD AUTO: 22.2 X10(3)/MCL (ref 4–11.5)

## 2025-02-24 PROCEDURE — 84702 CHORIONIC GONADOTROPIN TEST: CPT | Performed by: NURSE PRACTITIONER

## 2025-02-24 PROCEDURE — 96372 THER/PROPH/DIAG INJ SC/IM: CPT

## 2025-02-24 PROCEDURE — 25000003 PHARM REV CODE 250: Performed by: NURSE PRACTITIONER

## 2025-02-24 PROCEDURE — 63600175 PHARM REV CODE 636 W HCPCS

## 2025-02-24 PROCEDURE — 80307 DRUG TEST PRSMV CHEM ANLYZR: CPT

## 2025-02-24 PROCEDURE — 83690 ASSAY OF LIPASE: CPT | Performed by: NURSE PRACTITIONER

## 2025-02-24 PROCEDURE — 82150 ASSAY OF AMYLASE: CPT | Performed by: NURSE PRACTITIONER

## 2025-02-24 PROCEDURE — 96361 HYDRATE IV INFUSION ADD-ON: CPT

## 2025-02-24 PROCEDURE — 96374 THER/PROPH/DIAG INJ IV PUSH: CPT

## 2025-02-24 PROCEDURE — 85025 COMPLETE CBC W/AUTO DIFF WBC: CPT | Performed by: NURSE PRACTITIONER

## 2025-02-24 PROCEDURE — 80048 BASIC METABOLIC PNL TOTAL CA: CPT | Performed by: NURSE PRACTITIONER

## 2025-02-24 PROCEDURE — 99285 EMERGENCY DEPT VISIT HI MDM: CPT | Mod: 25

## 2025-02-24 PROCEDURE — 25500020 PHARM REV CODE 255

## 2025-02-24 PROCEDURE — 81003 URINALYSIS AUTO W/O SCOPE: CPT

## 2025-02-24 RX ORDER — PROCHLORPERAZINE MALEATE 10 MG
10 TABLET ORAL EVERY 6 HOURS PRN
Qty: 15 TABLET | Refills: 0 | Status: SHIPPED | OUTPATIENT
Start: 2025-02-24

## 2025-02-24 RX ORDER — ONDANSETRON 4 MG/1
4 TABLET, ORALLY DISINTEGRATING ORAL
Status: COMPLETED | OUTPATIENT
Start: 2025-02-24 | End: 2025-02-24

## 2025-02-24 RX ORDER — FAMOTIDINE 20 MG/1
20 TABLET, FILM COATED ORAL 2 TIMES DAILY
Qty: 60 TABLET | Refills: 11 | Status: SHIPPED | OUTPATIENT
Start: 2025-02-24 | End: 2026-02-24

## 2025-02-24 RX ORDER — NAPROXEN 500 MG/1
500 TABLET ORAL 2 TIMES DAILY
Qty: 20 TABLET | Refills: 0 | Status: SHIPPED | OUTPATIENT
Start: 2025-02-24

## 2025-02-24 RX ORDER — DICYCLOMINE HYDROCHLORIDE 20 MG/1
20 TABLET ORAL EVERY 6 HOURS PRN
Qty: 20 TABLET | Refills: 0 | Status: SHIPPED | OUTPATIENT
Start: 2025-02-24

## 2025-02-24 RX ORDER — DICYCLOMINE HYDROCHLORIDE 10 MG/ML
20 INJECTION INTRAMUSCULAR
Status: COMPLETED | OUTPATIENT
Start: 2025-02-24 | End: 2025-02-24

## 2025-02-24 RX ORDER — HYDROXYZINE 50 MG/ML
75 INJECTION, SOLUTION INTRAMUSCULAR ONCE
Status: COMPLETED | OUTPATIENT
Start: 2025-02-24 | End: 2025-02-24

## 2025-02-24 RX ORDER — KETOROLAC TROMETHAMINE 30 MG/ML
15 INJECTION, SOLUTION INTRAMUSCULAR; INTRAVENOUS
Status: COMPLETED | OUTPATIENT
Start: 2025-02-24 | End: 2025-02-24

## 2025-02-24 RX ADMIN — IOHEXOL 96 ML: 300 INJECTION, SOLUTION INTRAVENOUS at 09:02

## 2025-02-24 RX ADMIN — SODIUM CHLORIDE 1000 ML: 9 INJECTION, SOLUTION INTRAVENOUS at 09:02

## 2025-02-24 RX ADMIN — ONDANSETRON 4 MG: 4 TABLET, ORALLY DISINTEGRATING ORAL at 09:02

## 2025-02-24 RX ADMIN — DICYCLOMINE HYDROCHLORIDE 20 MG: 10 INJECTION, SOLUTION INTRAMUSCULAR at 10:02

## 2025-02-24 RX ADMIN — KETOROLAC TROMETHAMINE 15 MG: 30 INJECTION, SOLUTION INTRAMUSCULAR at 10:02

## 2025-02-24 RX ADMIN — HYDROXYZINE HYDROCHLORIDE 75 MG: 50 INJECTION, SOLUTION INTRAMUSCULAR at 10:02

## 2025-02-24 NOTE — Clinical Note
"Abhay Villagomezscottie Schilling was seen and treated in our emergency department on 2/24/2025.  She may return to work on 02/26/2025.       If you have any questions or concerns, please don't hesitate to call.      Greg Oviedo MD"

## 2025-02-25 NOTE — DISCHARGE INSTRUCTIONS
No serious cause of your stomach pain or vomiting was found.  The medication will help with your symptoms.  Call your doctor in the morning.

## 2025-02-25 NOTE — ED PROVIDER NOTES
Encounter Date: 2025       History     Chief Complaint   Patient presents with    Vomiting     Pt reports N/V/D since 1500 today, unable to keep anything down.      This 24-year-old female patient presents with complaints of nausea, vomiting, diarrhea and abdominal pain that started around 3:00 a.m. today.  The patient states she has had 8-10 episodes of vomiting in 810 episodes of watery diarrhea since the onset earlier today.    The history is provided by the patient.     Review of patient's allergies indicates:  No Known Allergies  Past Medical History:   Diagnosis Date    Anemia     Anxiety disorder, unspecified     Bulging disc     Heart murmur     Scoliosis      Past Surgical History:   Procedure Laterality Date     SECTION  2022    CHOLECYSTECTOMY  2022     Family History   Problem Relation Name Age of Onset    Hypertension Mother      Breast cancer Maternal Aunt          age unknown    Colon cancer Neg Hx      Ovarian cancer Neg Hx      Uterine cancer Neg Hx       Social History[1]  Review of Systems   Gastrointestinal:  Positive for abdominal pain, diarrhea, nausea and vomiting. Negative for abdominal distention.   All other systems reviewed and are negative.      Physical Exam     Initial Vitals [25]   BP Pulse Resp Temp SpO2   112/79 (!) 113 18 98.2 °F (36.8 °C) 100 %      MAP       --         Physical Exam    Nursing note and vitals reviewed.  Constitutional: She appears well-developed and well-nourished.   HENT:   Head: Normocephalic and atraumatic.   Neck: Neck supple.   Normal range of motion.  Cardiovascular:  Normal rate, regular rhythm and normal heart sounds.           Pulmonary/Chest: Breath sounds normal.   Abdominal: Abdomen is soft. She exhibits no mass. There is abdominal tenderness.   Hyperactive bowel sounds all quadrants There is guarding. There is no rebound.   Musculoskeletal:         General: Normal range of motion.      Cervical back: Normal range of  motion and neck supple.     Neurological: She is alert and oriented to person, place, and time.   Skin: Skin is warm and dry. Capillary refill takes less than 2 seconds.   Psychiatric: She has a normal mood and affect. Her behavior is normal. Judgment and thought content normal.         ED Course   Procedures  Labs Reviewed   BASIC METABOLIC PANEL - Abnormal       Result Value    Sodium 142      Potassium 4.4      Chloride 108      CO2 18 (*)     Glucose 112      Blood Urea Nitrogen 17      Creatinine 0.58 (*)     BUN/Creatinine Ratio 29 (*)     Calcium 9.4      Anion Gap 16.0 (*)     eGFR >90     CBC WITH DIFFERENTIAL - Abnormal    WBC 22.20 (*)     RBC 5.16 (*)     Hgb 12.9      Hct 40.2      MCV 77.9 (*)     MCH 25.0 (*)     MCHC 32.1      RDW 12.8      Platelet 282      MPV 9.6      NRBC% 0.0     MANUAL DIFFERENTIAL - Abnormal    Neutrophils % 88 (*)     Lymphs % 7 (*)     Monocytes % 3      Eosinophils % 2      Neutrophils Abs Calc 19.536 (*)     Lymphs Abs 1.554      Eosinophils Abs 0.444      Monocytes Abs 0.666      Platelets Normal      RBC Morph Normal     URINALYSIS, REFLEX TO URINE CULTURE - Abnormal    Color, UA Yellow      Appearance, UA Clear      Specific Gravity, UA 1.015      pH, UA 6.0      Protein, UA Negative      Glucose, UA Negative      Ketones, UA >=80 (*)     Blood, UA Negative      Bilirubin, UA Negative      Urobilinogen, UA 0.2      Nitrites, UA Negative      Leukocyte Esterase, UA Negative      Narrative:      URINE STABILITY IS 2 HOURS AT ROOM TEMP OR    SIX HOURS REFRIGERATED. PERFORMING TESTING ON    SPECIMENS GREATER THAN THIS AGE MAY AFFECT THE    FOLLOWING TESTS:    PH          SPECIFIC GRAVITY           BLOOD    CLARITY     BILIRUBIN               UROBILINOGEN   LIPASE - Normal    Lipase Level 54     AMYLASE - Normal    Amylase Level 84     DRUG SCREEN, URINE (BEAKER) - Normal    Amphetamines, Urine Negative      Barbiturates, Urine Negative      Benzodiazepine, Urine Negative       Cannabinoids, Urine Negative      Cocaine, Urine Negative      Opiates, Urine Negative      Phencyclidine, Urine Negative      Methadone, Urine Negative      pH, Urine 6.0      Narrative:     Cut off concentrations:    Amphetamines - 1000 ng/ml  Barbiturates - 200 ng/ml  Benzodiazepine - 200 ng/ml  Cannabinoids (THC) - 50 ng/ml  Cocaine - 300 ng/ml  Fentanyl - 1.0 ng/ml  MDMA - 500 ng/ml  Opiates - 300 ng/ml   Phencyclidine (PCP) - 25 ng/ml  Methadone - 300 ng/ml      False negatives may result form substances such as bleach added to urine.  False positives may result for the presence of a substance with similar chemical structure to the drug or its metabolite.    This test provides only a PRELIMINARY analytical test result. A more specific alternate chemical method must be used in order to obtain a confirmed analytical result. Gas chromatography/mass spectrometry (GC/MS) is the preferred confirmatory method. Other chemical confirmation methods are available. Clinical consideration and professional judgement should be applied to any drug of abuse test result, particularly when preliminary positive results are used.    Positive results will be confirmed only at the physicians request. Unconfirmed screening results are to be used only for medical purposes (treatment).          CBC W/ AUTO DIFFERENTIAL    Narrative:     The following orders were created for panel order CBC auto differential.  Procedure                               Abnormality         Status                     ---------                               -----------         ------                     CBC with Differential[[id number]]       Abnormal            Final result               Manual Differential[[id number]]         Abnormal            Final result                 Please view results for these tests on the individual orders.   HCG, QUANTITATIVE    Beta HCG Quant <2.39      Narrative:     Beta-HCG ref range weeks after implantation (mIU/mL):    3-4wks 9-130   4-5wks    5-6wks 850-44911   6-7wks 4500-518448   7-12wks 65326-097531   12-16wks [id number]   16-28wks 1400-57134   20-41wks [phone]          Imaging Results              CT Abdomen Pelvis With IV Contrast NO Oral Contrast (Preliminary result)  Result time 02/24/25 22:16:33      Preliminary result by Demetrio Lucero MD (02/24/25 22:16:33)                   Narrative:    START OF REPORT:  Technique: CT of the abdomen and pelvis was performed with axial images as well as sagittal and coronal reconstruction images with intravenous contrast.    Comparison: Comparison is with study dated 2024-05-28 21:01:48.    Clinical History: LOW ABD PN ND ELEVATED WBC.    Dosage Information: Automated Exposure Control was utilized.    Findings:  Lines and Tubes: None.  Thorax:  Lungs: There is minimal nonspecific dependent change at the lung bases. No focal infiltrate or consolidation is seen.  Pleura: No effusions or thickening.  Heart: The heart size is within normal limits.  Abdomen:  Abdominal Wall: No abdominal wall pathology is seen.  Liver: The liver appears unremarkable.  Biliary System: No intrahepatic or extrahepatic biliary duct dilatation is seen.  Gallbladder: The gallbladder is not identified in the gallbladder fossa which may reflect prior cholecystectomy correlate with surgical history and visual inspection.  Pancreas: The pancreas appears unremarkable.  Spleen: The spleen appears unremarkable.  Adrenals: The adrenal glands appear unremarkable.  Kidneys: The kidneys appear unremarkable with no stones cysts masses or hydronephrosis.  Aorta: The abdominal aorta appears unremarkable.  IVC: Unremarkable.  Bowel:  Esophagus: The visualized esophagus appears unremarkable.  Stomach: The stomach appears unremarkable.  Duodenum: Unremarkable appearing duodenum.  Small Bowel: The small bowel appears unremarkable.  Colon: Nondistended.  Appendix: The appendix appears unremarkable seen on Image 64,  Series 3 through Image 65, Series 3.  Peritoneum: No intraperitoneal free air or ascites is seen.    Pelvis:  Bladder: The bladder is nondistended but appears otherwise unremarkable.  Female:  Uterus: The uterus appears unremarkable.  Ovaries: The ovaries appear unremarkable with probable dominant right sided physiologic cysts.    Bony structures:  Dorsal Spine: There is subtle spondylosis of the visualized dorsal spine.  Bony Pelvis: The visualized bony structures of the pelvis appear unremarkable.      Impression:  1. No acute intraabdominal or pelvic solid organ or bowel pathology identified. Details and other findings as discussed above.                                         Medications   sodium chloride 0.9% bolus 1,000 mL 1,000 mL (0 mLs Intravenous Stopped 2/24/25 2209)   ondansetron disintegrating tablet 4 mg (4 mg Oral Given 2/24/25 2109)   hydrOXYzine injection 75 mg (75 mg Intramuscular Given 2/24/25 2222)   dicyclomine injection 20 mg (20 mg Intramuscular Given 2/24/25 2221)   ketorolac injection 15 mg (15 mg Intravenous Given 2/24/25 2221)   iohexoL (OMNIPAQUE 300) injection 100 mL (96 mLs Intravenous Given 2/24/25 2158)     Medical Decision Making  This 24-year-old female patient presents with complaints of nausea, vomiting, diarrhea and abdominal pain that started around 3:00 a.m. today.  The patient states she has had 8-10 episodes of vomiting in 810 episodes of watery diarrhea since the onset earlier today.  ER diagnoses---abd pain  Differential diagnosis includes but is not limited to cholelithiasis, cholecystitis, diverticulitis, all these diagnoses are less likely related to exam, lab results and history  2120---by briefly discussed this presentation, current labs, pending labs and CT scan with Dr. Chapin in anticipation for this patient to be transferred to the main ED at 10:00 p.m.    Amount and/or Complexity of Data Reviewed  Labs: ordered.  Radiology: ordered.    Risk  Prescription drug  management.                                      Clinical Impression:  Final diagnoses:  [R11.2] Nausea and vomiting, unspecified vomiting type (Primary)  [R10.84] Generalized abdominal pain          ED Disposition Condition    Discharge Good          ED Prescriptions       Medication Sig Dispense Start Date End Date Auth. Provider    prochlorperazine (COMPAZINE) 10 MG tablet Take 1 tablet (10 mg total) by mouth every 6 (six) hours as needed. 15 tablet 2/24/2025 -- Greg Oviedo MD    dicyclomine (BENTYL) 20 mg tablet Take 1 tablet (20 mg total) by mouth every 6 (six) hours as needed (Abdominal Pain). 20 tablet 2/24/2025 -- Greg Oviedo MD    naproxen (NAPROSYN) 500 MG tablet Take 1 tablet (500 mg total) by mouth 2 (two) times daily. 20 tablet 2/24/2025 -- Greg Oviedo MD    famotidine (PEPCID) 20 MG tablet Take 1 tablet (20 mg total) by mouth 2 (two) times daily. 60 tablet 2/24/2025 2/24/2026 Greg Oviedo MD          Follow-up Information       Follow up With Specialties Details Why Contact Info    Sally Nobles NP Family Medicine Call in 1 day  [address] [facility] [address] [phone]                   [1]   Social History  Tobacco Use    Smoking status: Never     Passive exposure: Never    Smokeless tobacco: Never   Substance Use Topics    Alcohol use: Yes     Comment: occiassionally    Drug use: Not Currently        Greg Oviedo MD  02/24/25 9470       Libra Bright FNP  02/25/25 7005

## 2025-04-14 ENCOUNTER — OFFICE VISIT (OUTPATIENT)
Dept: OBSTETRICS AND GYNECOLOGY | Facility: CLINIC | Age: 25
End: 2025-04-14
Payer: COMMERCIAL

## 2025-04-14 VITALS
HEIGHT: 60 IN | TEMPERATURE: 97 F | WEIGHT: 165 LBS | BODY MASS INDEX: 32.39 KG/M2 | SYSTOLIC BLOOD PRESSURE: 114 MMHG | DIASTOLIC BLOOD PRESSURE: 74 MMHG

## 2025-04-14 DIAGNOSIS — Z01.419 WELL WOMAN EXAM WITH ROUTINE GYNECOLOGICAL EXAM: Primary | ICD-10-CM

## 2025-04-14 DIAGNOSIS — Z32.01 PREGNANCY CONFIRMED BY POSITIVE URINE TEST: ICD-10-CM

## 2025-04-14 DIAGNOSIS — B96.89 BACTERIAL VAGINOSIS: ICD-10-CM

## 2025-04-14 DIAGNOSIS — Z12.4 CERVICAL CANCER SCREENING: ICD-10-CM

## 2025-04-14 DIAGNOSIS — N76.0 BACTERIAL VAGINOSIS: ICD-10-CM

## 2025-04-14 DIAGNOSIS — Z80.3 FAMILY HISTORY OF BREAST CANCER: ICD-10-CM

## 2025-04-14 DIAGNOSIS — M54.31 RIGHT SCIATIC NERVE PAIN: ICD-10-CM

## 2025-04-14 DIAGNOSIS — R30.0 DYSURIA: ICD-10-CM

## 2025-04-14 DIAGNOSIS — N89.8 VAGINAL DISCHARGE: ICD-10-CM

## 2025-04-14 DIAGNOSIS — Z11.3 SCREENING EXAMINATION FOR STD (SEXUALLY TRANSMITTED DISEASE): ICD-10-CM

## 2025-04-14 LAB
BACTERIA HYPHAE, POC: POSITIVE
BILIRUB UR QL STRIP: NEGATIVE
GARDNERELLA VAGINALIS: NEGATIVE
GLUCOSE UR QL STRIP: NEGATIVE
KETONES UR QL STRIP: NEGATIVE
LEUKOCYTE ESTERASE UR QL STRIP: NEGATIVE
OTHER MICROSC. OBSERVATIONS: ABNORMAL
PH, POC UA: 6.5
POC BACTERIAL VAGINOSIS: POSITIVE
POC BLOOD, URINE: NEGATIVE
POC CLUE CELLS: POSITIVE
POC NITRATES, URINE: NEGATIVE
PROT UR QL STRIP: NEGATIVE
SP GR UR STRIP: 1.02 (ref 1–1.03)
TRICHOMONAS, POC: NEGATIVE
UROBILINOGEN UR STRIP-ACNC: 0.2 (ref 0.1–1.1)
YEAST WET PREP: NEGATIVE
YEAST, POC: NEGATIVE

## 2025-04-14 RX ORDER — METRONIDAZOLE 500 MG/1
500 TABLET ORAL EVERY 12 HOURS
Qty: 14 TABLET | Refills: 0 | Status: SHIPPED | OUTPATIENT
Start: 2025-04-14 | End: 2025-04-21

## 2025-04-14 NOTE — PROGRESS NOTES
Chief Complaint:   Well Woman     History of Present Illness:  Abhay Schilling is a 24 y.o. year old  presents for her well woman exam. C/o missed cycle. Patient's last menstrual period was 2025 (exact date).. Prior to this, having regular monthly cycles. Sexually active. Denies vaginal bleeding, discharge or pelvic pain. C/o dysuria x 1 week. UPT positive today.    Patient is currently on Neurontin for a sciatic pain to her right side.  Patient states she takes it once or twice a month during a flare-up.      Gyn History:  Menstrual History  Cycle: Yes  Menarche Age: 17 years  Flow Duration: 3  Flow: (!) Heavy  Interval:  (irregular)  Intermenstrual Bleeding: No  Dysmenorrhea: Yes  Dysmenorrhea Severity : Mild    Menopause  Menopause Age: 2 years  Post Menopausal Bleeding: No  Hormone Replacement Therapy: No    Pap History  Last pap date: 24 (NIL -GC CZ TV)  Result: Normal  History of Abnormal Pap: No  HPV Vaccine Completed: No (0/3)    [place]  Sexually Active: Yes  Sexual Orientation: heterosexual  Postcoital Bleeding: No  Dyspareunia: Yes  STI History: No  Contraception: No  Contraception Type: No method    Breast History  Last Breast Imaging Date: No  History of Breast Biopsy: No        Review of Systems:  General/Constitutional: Chills denies. Fatigue/weakness denies. Fever denies. Night sweats denies. Hot flashes denies.  Gastrointestinal: Abdominal pain denies. Blood in stool denies. Constipation denies. Diarrhea denies. Heartburn denies. Nausea denies. Vomiting denies.   Genitourinary: Incontinence denies. Blood in urine denies. Frequent urination denies. Urgency denies. Painful urination admits. Nocturia denies.  Gynecologic: Irregular menses denies. Heavy bleeding denies. Painful menses denies. Vaginal discharge denies. Vaginal odor denies. Vaginal itching/Irritation denies. Vaginal lesion denies.  Pelvic pain denies. Decreased libido denies. Vulvar lesion denies. Prolapse of  genital organs denies. Painful intercourse denies. Postcoital bleeding denies.   Psychiatric: Mood lability denies. Depressed mood denies. Suicidal thoughts denies. Anxiety denies. Overwhelmed denies. Appetite normal. Energy level normal.    OB History    Para Term  AB Living   1 1 0 1 0 1   SAB IAB Ectopic Multiple Live Births   0 0 0 0 1      # 1 - Date: 24, Sex: Male, Weight: 2.551 kg (5 lb 10 oz), GA: 36w0d, Type: , Low Transverse, Apgar1: None, Apgar5: None, Living: Living, Birth Comments: None      Past Medical History:   Diagnosis Date    Anemia     Anxiety disorder, unspecified     Bulging disc     Heart murmur     Scoliosis      Past Surgical History:   Procedure Laterality Date     SECTION  2022    CHOLECYSTECTOMY  2022      Current Medications[1]    Review of patient's allergies indicates:  No Known Allergies    Social History[2]  Family History   Problem Relation Name Age of Onset    Hypertension Mother      Breast cancer Maternal Aunt          age unknown    Colon cancer Neg Hx      Ovarian cancer Neg Hx      Uterine cancer Neg Hx           Physical Exam:  /74 (BP Location: Right arm, Patient Position: Sitting)   Temp 97.3 °F (36.3 °C)   Ht 5' (1.524 m)   Wt 74.8 kg (165 lb)   LMP 2025 (Exact Date) Comment: irregular  BMI 32.22 kg/m²       Chaperone: present.       General appearance: healthy, well-nourished and well-developed     Psychiatric: Orientation to time, place and person. Normal mood and affect and active, alert     Skin:   Appearance: no rashes or lesions.     Neck:   Neck: supple, FROM, trachea midline. and no masses   Thyroid: no enlargement or nodules and non-tender.       Cardiovascular:   Auscultation: RRR and no murmur.   Peripheral Vascular: no varicosities, LLE edema, RLE edema, calf tenderness, and palpable cord and pedal pulses intact.     Lungs:   Respiratory effort: no intercostal retractions or accessory muscle  usage.   Auscultation: no wheezing, rales/crackles, or rhonchi and clear to auscultation.     Breast:   Inspection/Palpation: no tenderness, discrete/distinct masses, skin changes, or abnormal secretions. Nipple appearance normal.     Abdomen:   Auscultation/Inspection/Palpation: no hepatomegaly, splenomegaly, masses, tenderness or CVA tenderness and soft, non-distended bowel sounds preset.    Hernia: no palpable hernias.     Female Genitalia:   Vulva: no masses, tenderness or lesions   Bladder/Urethra: no urethral discharge or mass, normal meatus, bladder non-distended.   Vagina: no tenderness, erythema, cystocele, rectocele, or vesicle(s) or ulcers, +white creamy discharge   Cervix: no discharge, no cervical lacerations noted or motion tenderness and grossly normal   Uterus: normal size and shape and midline, non-tender, and no uterine prolapse.   Adnexa/Parametria: no parametrial tenderness or mass, no adnexal tenderness or ovarian mass.     Lymph Nodes:   Palpation: non tender submandibular nodes, axillary nodes, or inguinal nodes.     Rectal Exam:   Rectum: normal perianal skin.      Wet prep: +whiff and clue      Assessment/Plan:  1. Well woman exam with routine gynecological exam    2. Bacterial vaginosis  -     metroNIDAZOLE (FLAGYL) 500 MG tablet; Take 1 tablet (500 mg total) by mouth every 12 (twelve) hours. for 7 days  Dispense: 14 tablet; Refill: 0    3. Dysuria  -     POCT Urinalysis, Dipstick, Automated, W/O Scope  -     POCT Wet Prep  -     POCT KOH    4. Vaginal discharge  -     POCT Wet Prep  -     POCT KOH    5. Right sciatic nerve pain    6. Pregnancy confirmed by positive urine test    7. Screening examination for STD (sexually transmitted disease)    8. Family history of breast cancer    9. Cervical cancer screening      Pap  Recommend BSE monthly  Discussed recommendations of annual screening after age of 40 with mammogram    Explained that screening is not 100% reliable. Advised patient if she  notices any changes to her breast including a lump, mass, dimpling, discharge, rash, or tenderness she should contact us immediately.     Healthy diet, exercise   Multivitamin   Seat belt   Sunscreen use   Safe sex/ STI education   Contraception evaluation: none; UPT positive today  Gardasil evaluation: 0/3    Urine dipstick WNL    UPT+   Educated   No JARON   PNV, folic acid   Pain/fever/bleeding prec     Educated   Wet prep: clue + whiff   Flagyl 500mg BID x7 days  No douching   Call office if no improvement in 72 hours     Discussed the various types of STDs, related symptoms and the potential consequences (including effects on fertility) of STD infections.       Reviewed ways to limit exposure and prevention techniques.       Cultures: gc/cz/tv    Labs: will draw at new ob    Continue Neurontin for sciatic pain as needed.       Based on Patient's last menstrual period was 03/13/2025 (exact date). , at 4w4d with VIKAS of 12/18/25  Follow up new ob visit in 3 weeks with Dr. Medrano             [1]   Current Outpatient Medications:     dicyclomine (BENTYL) 20 mg tablet, Take 1 tablet (20 mg total) by mouth every 6 (six) hours as needed (Abdominal Pain)., Disp: 20 tablet, Rfl: 0    famotidine (PEPCID) 20 MG tablet, Take 1 tablet (20 mg total) by mouth 2 (two) times daily. (Patient taking differently: Take 20 mg by mouth daily as needed for Heartburn.), Disp: 60 tablet, Rfl: 11    gabapentin (NEURONTIN) 300 MG capsule, Take 300 mg by mouth as needed., Disp: , Rfl:     LEXAPRO 5 mg Tab, Take 5 mg by mouth., Disp: , Rfl:     ondansetron (ZOFRAN-ODT) 4 MG TbDL, Take 1 tablet (4 mg total) by mouth every 6 (six) hours as needed (Nausea and vomiting)., Disp: 20 tablet, Rfl: 0    prochlorperazine (COMPAZINE) 10 MG tablet, Take 1 tablet (10 mg total) by mouth every 6 (six) hours as needed., Disp: 15 tablet, Rfl: 0    metroNIDAZOLE (FLAGYL) 500 MG tablet, Take 1 tablet (500 mg total) by mouth every 12 (twelve) hours. for 7 days,  Disp: 14 tablet, Rfl: 0  [2]   Social History  Socioeconomic History    Marital status: Single   Tobacco Use    Smoking status: Never     Passive exposure: Never    Smokeless tobacco: Never   Substance and Sexual Activity    Alcohol use: Yes     Comment: occiassionally    Drug use: Not Currently    Sexual activity: Yes     Partners: Male     Birth control/protection: None, Condom      yes

## 2025-04-23 ENCOUNTER — RESULTS FOLLOW-UP (OUTPATIENT)
Dept: OBSTETRICS AND GYNECOLOGY | Facility: CLINIC | Age: 25
End: 2025-04-23

## 2025-04-24 ENCOUNTER — PATIENT MESSAGE (OUTPATIENT)
Dept: OBSTETRICS AND GYNECOLOGY | Facility: CLINIC | Age: 25
End: 2025-04-24
Payer: COMMERCIAL

## 2025-05-12 ENCOUNTER — INITIAL PRENATAL (OUTPATIENT)
Dept: OBSTETRICS AND GYNECOLOGY | Facility: CLINIC | Age: 25
End: 2025-05-12
Payer: COMMERCIAL

## 2025-05-12 ENCOUNTER — LAB VISIT (OUTPATIENT)
Dept: LAB | Facility: HOSPITAL | Age: 25
End: 2025-05-12
Attending: OBSTETRICS & GYNECOLOGY
Payer: COMMERCIAL

## 2025-05-12 ENCOUNTER — PATIENT MESSAGE (OUTPATIENT)
Dept: OBSTETRICS AND GYNECOLOGY | Facility: CLINIC | Age: 25
End: 2025-05-12

## 2025-05-12 ENCOUNTER — TELEPHONE (OUTPATIENT)
Dept: OBSTETRICS AND GYNECOLOGY | Facility: CLINIC | Age: 25
End: 2025-05-12

## 2025-05-12 VITALS — SYSTOLIC BLOOD PRESSURE: 138 MMHG | DIASTOLIC BLOOD PRESSURE: 82 MMHG | BODY MASS INDEX: 30.66 KG/M2 | WEIGHT: 157 LBS

## 2025-05-12 DIAGNOSIS — O21.9 NAUSEA AND VOMITING DURING PREGNANCY: ICD-10-CM

## 2025-05-12 DIAGNOSIS — O20.9 VAGINAL BLEEDING AFFECTING EARLY PREGNANCY: Primary | ICD-10-CM

## 2025-05-12 DIAGNOSIS — Q87.19 NOONAN'S SYNDROME: ICD-10-CM

## 2025-05-12 DIAGNOSIS — Z3A.08 8 WEEKS GESTATION OF PREGNANCY: ICD-10-CM

## 2025-05-12 DIAGNOSIS — N92.6 MISSED MENSES: ICD-10-CM

## 2025-05-12 LAB
BASOPHILS # BLD AUTO: 0.05 X10(3)/MCL (ref 0.01–0.08)
BASOPHILS NFR BLD AUTO: 0.4 % (ref 0.1–1.2)
BILIRUB UR QL STRIP: NEGATIVE
EOSINOPHIL # BLD AUTO: 0.04 X10(3)/MCL (ref 0.04–0.36)
EOSINOPHIL NFR BLD AUTO: 0.3 % (ref 0.7–7)
ERYTHROCYTE [DISTWIDTH] IN BLOOD BY AUTOMATED COUNT: 16.2 % (ref 11–14.5)
GLUCOSE UR QL STRIP: NEGATIVE
GROUP & RH: NORMAL
HBV SURFACE AG SERPL QL IA: NEGATIVE
HBV SURFACE AG SERPL QL IA: NORMAL
HCT VFR BLD AUTO: 38.7 % (ref 36–48)
HCV AB SERPL QL IA: NONREACTIVE
HGB BLD-MCNC: 12 G/DL (ref 11.8–16)
HIV 1+2 AB+HIV1 P24 AG SERPL QL IA: NONREACTIVE
IMM GRANULOCYTES # BLD AUTO: 0.04 X10(3)/MCL (ref 0–0.03)
IMM GRANULOCYTES NFR BLD AUTO: 0.3 % (ref 0–0.5)
INDIRECT COOMBS: NORMAL
KETONES UR QL STRIP: POSITIVE
LEUKOCYTE ESTERASE UR QL STRIP: POSITIVE
LYMPHOCYTES # BLD AUTO: 1.55 X10(3)/MCL (ref 1.16–3.74)
LYMPHOCYTES NFR BLD AUTO: 12.9 % (ref 20–55)
MCH RBC QN AUTO: 22.1 PG (ref 27–34)
MCHC RBC AUTO-ENTMCNC: 31 G/DL (ref 31–37)
MCV RBC AUTO: 71.4 FL (ref 79–99)
MONOCYTES # BLD AUTO: 0.93 X10(3)/MCL (ref 0.24–0.36)
MONOCYTES NFR BLD AUTO: 7.8 % (ref 4.7–12.5)
NEUTROPHILS # BLD AUTO: 9.38 X10(3)/MCL (ref 1.56–6.13)
NEUTROPHILS NFR BLD AUTO: 78.3 % (ref 37–73)
NRBC BLD AUTO-RTO: 0 %
PH, POC UA: 6
PLATELET # BLD AUTO: 290 X10(3)/MCL (ref 140–371)
PMV BLD AUTO: 10.2 FL (ref 9.4–12.4)
POC BLOOD, URINE: NEGATIVE
POC NITRATES, URINE: NEGATIVE
PROT UR QL STRIP: POSITIVE
RBC # BLD AUTO: 5.42 X10(6)/MCL (ref 4–5.1)
RUBELLA AB, IGG (OLG): 54 IU/ML
SP GR UR STRIP: 1.03 (ref 1–1.03)
SPECIMEN OUTDATE: NORMAL
T PALLIDUM AB SER QL: NONREACTIVE
UROBILINOGEN UR STRIP-ACNC: 0.2 (ref 0.1–1.1)
WBC # BLD AUTO: 11.99 X10(3)/MCL (ref 4–11.5)

## 2025-05-12 PROCEDURE — 87661 TRICHOMONAS VAGINALIS AMPLIF: CPT | Performed by: OBSTETRICS & GYNECOLOGY

## 2025-05-12 PROCEDURE — 86780 TREPONEMA PALLIDUM: CPT

## 2025-05-12 PROCEDURE — 86901 BLOOD TYPING SEROLOGIC RH(D): CPT | Performed by: OBSTETRICS & GYNECOLOGY

## 2025-05-12 PROCEDURE — 86787 VARICELLA-ZOSTER ANTIBODY: CPT

## 2025-05-12 PROCEDURE — 86762 RUBELLA ANTIBODY: CPT

## 2025-05-12 PROCEDURE — 36415 COLL VENOUS BLD VENIPUNCTURE: CPT

## 2025-05-12 PROCEDURE — 85025 COMPLETE CBC W/AUTO DIFF WBC: CPT

## 2025-05-12 PROCEDURE — 87591 N.GONORRHOEAE DNA AMP PROB: CPT | Performed by: OBSTETRICS & GYNECOLOGY

## 2025-05-12 PROCEDURE — 87389 HIV-1 AG W/HIV-1&-2 AB AG IA: CPT

## 2025-05-12 PROCEDURE — 87340 HEPATITIS B SURFACE AG IA: CPT

## 2025-05-12 PROCEDURE — 86803 HEPATITIS C AB TEST: CPT

## 2025-05-12 NOTE — PROGRESS NOTES
"Chief Complaint:  Initial Prenatal Visit, Nausea, Vaginal Bleeding, and Other    History of Present Illness:  Abhay Schilling is a 24 y.o. year old  presents for her new ob, 8w4d by LMP. Patient's last menstrual period was 2025 (exact date). Reports cramping Friday, Saturday and began having heavy bleeding last night. States, "heavy bleeding like a period going through 3 pads and cramping, worse than a period". Reports since this morning, light spotting with continued pain.     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       Gyn History:    Menstrual History  Cycle: Yes (LMP: 3/13/2025)  Menarche Age: 17 years  Flow Duration: 3  Flow: (!) Heavy  Interval:  (Irregular)  Intermenstrual Bleeding: No  Dysmenorrhea: Yes  Dysmenorrhea Severity : Mild    Menopause  Menopause Age: 2 years    Pap History  Last pap date: 25  Result: Normal  History of Abnormal Pap: No  HPV Vaccine Completed: No    [place]  Sexually Active: Yes  Sexual Orientation: heterosexual  Postcoital Bleeding: No  Dyspareunia: No  STI History: No  Contraception: No    Breast History  Last Breast Imaging Date: No  History of Breast Biopsy: No          Review of Systems:  General/Constitutional: Chills denies. Fatigue/weakness denies. Fever denies. Night sweats denies. Hot flashes denies.   Respiratory: Cough denies. Hemoptysis denies. SOB denies. Sputum production denies. Wheezing denies.   Cardiovascular: Chest pain denies. Dizziness denies. Palpitations denies. Swelling in hands/feet denies.    Gastrointestinal: Abdominal pain denies. Blood in stool denies. Constipation denies. Diarrhea denies. Heartburn denies. Nausea denies. Vomiting denies.   Genitourinary: Incontinence denies. Blood in urine denies. Frequent urination denies. Painful urination denies.  Urinary urgency " denies.  Nocturia denies.   Gynecologic: Irregular menses denies. Vaginal bleeding. Painful menses denies. Vaginal discharge denies. Vaginal odor denies. Vaginal itching denies. Vaginal lesion denies.  Pelvic pain admits. Decreased libido denies. Vulvar lesion denies. Prolapse of genital organs denies. Painful intercourse denies. Postcoital bleeding denies.   Psychiatric: Depression denies. Anxiety denies.     OB History    Para Term  AB Living   2 1 0 1 0 1   SAB IAB Ectopic Multiple Live Births   0 0 0 0 1      # 1 - Date: 24, Sex: Male, Weight: 2.551 kg (5 lb 10 oz), GA: 36w0d, Type: , Low Transverse, Apgar1: None, Apgar5: None, Living: Living, Birth Comments: None    # 2 - Date: None, Sex: None, Weight: None, GA: None, Type: None, Apgar1: None, Apgar5: None, Living: None, Birth Comments: None        Past Medical History:   Diagnosis Date    Anemia     Anxiety disorder, unspecified     Bulging disc     Heart murmur     Scoliosis      Current Medications[1]    Review of patient's allergies indicates:  No Known Allergies    Past Surgical History:   Procedure Laterality Date     SECTION  2022    CHOLECYSTECTOMY  2022     Family History   Problem Relation Name Age of Onset    Hypertension Mother      Breast cancer Maternal Aunt          age unknown    Colon cancer Neg Hx      Ovarian cancer Neg Hx      Uterine cancer Neg Hx       Social History[2]    Physical Exam:  /82   Wt 71.2 kg (157 lb)   LMP 2025 (Exact Date) Comment: irregular  BMI 30.66 kg/m²     General Exam:    General Appearance: alert, in no acute distress, normal, well nourished.  Psych:  Orientation: time, place, person.  Mood/Affect: Normal   Abdomen:  - Soft. Non-tender. No rebound tenderness or guardingNo masses. Liver normal. Spleen normal. No hernia palpable.  Pelvis:   - Vulva: Normal   - Perianal skin: Normal   - Urethra: Normal meatus. Q-tip: Not performed   - Vagina: NormalVaginal  discharge present: . Cystocele: Absent. Rectocele: Absent   - Cervix: Normal. Cervical motion tenderness Absent   - Uterus:  Mobile. Non-tender.   - Adnexal: Normal    TVUS:    IUP W CRL: 9w1d  VIKAS: 12/18/25 by LMP, c/w todays us  FHT: 180  No evidence of blood on exam     Assessment/Plan:  1. Vaginal bleeding affecting early pregnancy [O20.9]    2. 8 weeks gestation of pregnancy  -     Rubella Antibody, IgG; Future; Expected date: 05/12/2025  -     Varicella Zoster Antibody, IgG; Future; Expected date: 05/12/2025  -     HIV 1/2 Ag/Ab (4th Gen); Future; Expected date: 05/12/2025  -     SYPHILIS ANTIBODY (WITH REFLEX RPR); Future; Expected date: 05/12/2025  -     Hepatitis C Antibody; Future; Expected date: 05/12/2025  -     Hepatitis B Surface Antigen; Future; Expected date: 05/12/2025  -     Type & Screen; Future; Expected date: 05/12/2025  -     CBC Auto Differential; Future; Expected date: 05/12/2025    3. Missed menses  -     C.trach/N.gonor AMP RNA  -     Trichomonas vaginalis, RNA, Qual  -     POCT Urinalysis, Dipstick, Automated, W/O Scope  -     Urine Culture High Risk  -     US OB/GYN Procedure ([facility]) - Extended List    4. [place]'s syndrome    5. 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      We discussed 50% risk of having affected child and each pregnancy.      MFM referral in second trimester for Level II US and serial growth US  CFDNA     To call if nausea, vomiting unrelieved. PO hydration, precautions     Breastfeeding education given     RTC 4 weeks ob checks        This note was transcribed by Radha Gonzalez. There may be transcription errors as a result, however minimal. Effort has been made to ensure accuracy of transcription, but any obvious errors or omissions should be clarified with the author of the document.       I agree with the above documentation.              [1]   Current Outpatient Medications:     gabapentin (NEURONTIN) 300 MG capsule, Take 300 mg by mouth as needed., Disp: , Rfl:     dicyclomine (BENTYL) 20 mg tablet, Take 1 tablet (20 mg total) by mouth every 6 (six) hours as needed (Abdominal Pain). (Patient not taking: Reported on 5/12/2025), Disp: 20 tablet, Rfl: 0    famotidine (PEPCID) 20 MG tablet, Take 1 tablet (20 mg total) by mouth 2 (two) times daily. (Patient not taking: Reported on 5/12/2025), Disp: 60 tablet, Rfl: 11    LEXAPRO 5 mg Tab, Take 5 mg by mouth. (Patient not taking: Reported on 5/12/2025), Disp: , Rfl:     ondansetron (ZOFRAN-ODT) 4 MG TbDL, Take 1 tablet (4 mg total) by mouth every 6 (six) hours as needed (Nausea and vomiting). (Patient not taking: Reported on 5/12/2025), Disp: 20 tablet, Rfl: 0    prochlorperazine (COMPAZINE) 10 MG tablet, Take 1 tablet (10 mg total) by mouth every 6 (six) hours as needed. (Patient not taking: Reported on 5/12/2025), Disp: 15 tablet, Rfl: 0  [2]   Social History  Socioeconomic History    Marital status: Single   Tobacco Use    Smoking status: Never     Passive exposure: Never    Smokeless tobacco: Never   Substance and Sexual Activity    Alcohol use: Yes     Comment: occiassionally    Drug use: Not Currently    Sexual activity: Yes     Partners: Male     Birth control/protection: None

## 2025-05-12 NOTE — TELEPHONE ENCOUNTER
"Patient called stating that on Fri and Saturday she starting having cramping, last night she starting having heavy bleeding like a period. States the cramps are worse than period cramps. LMP: 3/13/2025. Per CALDERON "Have her come in today." ED/pain/bleeding precautions given to pt. Call transferred to [facility] to schedule patient for New OB today.   "

## 2025-05-13 LAB
C TRACH RRNA SPEC QL NAA+PROBE: NEGATIVE
N GONORRHOEA RRNA SPEC QL NAA+PROBE: NEGATIVE
SPECIMEN SOURCE: NORMAL
T VAGINALIS RRNA SPEC QL NAA+PROBE: NEGATIVE
VZV IGG SER IA-ACNC: 0.6
VZV IGG SER QL IA: NEGATIVE

## 2025-05-15 LAB — BACTERIA UR CULT: NORMAL

## 2025-06-04 NOTE — PROGRESS NOTES
HPI  24 y.o.  at 12w4d  here for OB check.  Nausea improved.         ROS  Review of Systems   Constitutional:  Negative for chills and fever.   Gastrointestinal:  Negative for abdominal pain, constipation and diarrhea.   Genitourinary:  Negative for flank pain, genital sores, pelvic pain, urgency, vaginal bleeding, vaginal discharge, vaginal pain, postcoital bleeding and vaginal odor.         OBJECTIVE  /82   Wt 70.8 kg (156 lb)   LMP 2025 (Exact Date) Comment: irregular  BMI 30.47 kg/m²     Gen: No distress  Abdomen: Gravid, non-tender  Extremities: No edema    FHT: 160      ASSESSMENT  1. Uterine scar from previous  delivery affecting pregnancy    2. 12 weeks gestation of pregnancy  - POCT Urinalysis, Dipstick, Automated, W/O Scope    3. Nausea and vomiting during pregnancy        PLAN  Reviewed standard labor unit and kick count precautions.  Discussed pre-eclampsia precautions.  Discussed COVID-19 risks, social distancing, and isolation if respiratory symptoms develop.     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    We discussed 50% risk of having affected child and each pregnancy.    MFM referral in second trimester for Level II US and serial growth US  CFDNA     PO hydration, precautions     Discussed varicella non immune status and risks of infection.  Avoid sick contacts.  Notify us if contact occurs.       RTC 4 weeks ob check     This note was transcribed by Radha Gonzalez. There may be transcription errors as a result, however minimal. Effort has been made to ensure accuracy of transcription, but any obvious errors or omissions should be clarified with the author of the document.       I agree with the above documentation.

## 2025-06-09 ENCOUNTER — ROUTINE PRENATAL (OUTPATIENT)
Dept: OBSTETRICS AND GYNECOLOGY | Facility: CLINIC | Age: 25
End: 2025-06-09
Payer: COMMERCIAL

## 2025-06-09 ENCOUNTER — TELEPHONE (OUTPATIENT)
Dept: MATERNAL FETAL MEDICINE | Facility: CLINIC | Age: 25
End: 2025-06-09
Payer: COMMERCIAL

## 2025-06-09 VITALS — SYSTOLIC BLOOD PRESSURE: 128 MMHG | DIASTOLIC BLOOD PRESSURE: 82 MMHG | BODY MASS INDEX: 30.47 KG/M2 | WEIGHT: 156 LBS

## 2025-06-09 DIAGNOSIS — Z28.39 MATERNAL VARICELLA, NON-IMMUNE: ICD-10-CM

## 2025-06-09 DIAGNOSIS — O09.899 MATERNAL VARICELLA, NON-IMMUNE: ICD-10-CM

## 2025-06-09 DIAGNOSIS — Z3A.12 12 WEEKS GESTATION OF PREGNANCY: ICD-10-CM

## 2025-06-09 DIAGNOSIS — O34.219 UTERINE SCAR FROM PREVIOUS CESAREAN DELIVERY AFFECTING PREGNANCY: Primary | ICD-10-CM

## 2025-06-09 DIAGNOSIS — O21.9 NAUSEA AND VOMITING DURING PREGNANCY: ICD-10-CM

## 2025-06-09 LAB
BILIRUB UR QL STRIP: NEGATIVE
GLUCOSE UR QL STRIP: NEGATIVE
KETONES UR QL STRIP: NEGATIVE
LEUKOCYTE ESTERASE UR QL STRIP: NEGATIVE
PH, POC UA: 5.5
POC BLOOD, URINE: NEGATIVE
POC NITRATES, URINE: NEGATIVE
PROT UR QL STRIP: POSITIVE
SP GR UR STRIP: 1.03 (ref 1–1.03)
UROBILINOGEN UR STRIP-ACNC: 0.2 (ref 0.1–1.1)

## 2025-06-16 ENCOUNTER — PATIENT MESSAGE (OUTPATIENT)
Dept: OBSTETRICS AND GYNECOLOGY | Facility: CLINIC | Age: 25
End: 2025-06-16
Payer: COMMERCIAL

## 2025-07-03 ENCOUNTER — PATIENT MESSAGE (OUTPATIENT)
Dept: OTHER | Facility: OTHER | Age: 25
End: 2025-07-03
Payer: COMMERCIAL

## 2025-07-09 NOTE — PROGRESS NOTES
HPI  24 y.o.  at 17w5d  here for OB check.      ROS  Review of Systems   Constitutional:  Negative for chills and fever.   Gastrointestinal:  Negative for abdominal pain, constipation and diarrhea.   Genitourinary:  Negative for flank pain, genital sores, pelvic pain, urgency, vaginal bleeding, vaginal discharge, vaginal pain, postcoital bleeding and vaginal odor.         OBJECTIVE  BP (!) 116/58   Ht 5' (1.524 m)   Wt 72.1 kg (159 lb)   LMP 2025 (Exact Date) Comment: irregular  BMI 31.05 kg/m²     Gen: No distress  Abdomen: Gravid, non-tender  Extremities: No edema    FHT: 130        ASSESSMENT  1. Uterine scar from previous  delivery affecting pregnancy    2. 17 weeks gestation of pregnancy  - POCT Urinalysis, Dipstick, Automated, W/O Scope    3. Maternal varicella, non-immune        PLAN  Reviewed standard labor unit and kick count precautions.  Discussed pre-eclampsia precautions.  Discussed COVID-19 risks, social distancing, and isolation if respiratory symptoms develop.     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    We discussed 50% risk of having affected child and each pregnancy.    MFM referral in second trimester for Level II US and serial growth US      CFDNA low risk    RTC 4 weeks ob check     This note was transcribed by Radha Gonzalez. There may be transcription errors as a result, however minimal. Effort has been made to ensure accuracy of transcription, but any obvious errors or omissions should be clarified with the author of the document.       I agree with the above documentation.

## 2025-07-10 ENCOUNTER — PATIENT MESSAGE (OUTPATIENT)
Dept: OTHER | Facility: OTHER | Age: 25
End: 2025-07-10
Payer: COMMERCIAL

## 2025-07-14 DIAGNOSIS — Z36.89 ENCOUNTER FOR FETAL ANATOMIC SURVEY: Primary | ICD-10-CM

## 2025-07-15 ENCOUNTER — ROUTINE PRENATAL (OUTPATIENT)
Dept: OBSTETRICS AND GYNECOLOGY | Facility: CLINIC | Age: 25
End: 2025-07-15
Payer: COMMERCIAL

## 2025-07-15 VITALS
BODY MASS INDEX: 31.22 KG/M2 | SYSTOLIC BLOOD PRESSURE: 116 MMHG | WEIGHT: 159 LBS | DIASTOLIC BLOOD PRESSURE: 58 MMHG | HEIGHT: 60 IN

## 2025-07-15 DIAGNOSIS — O09.899 MATERNAL VARICELLA, NON-IMMUNE: ICD-10-CM

## 2025-07-15 DIAGNOSIS — O34.219 UTERINE SCAR FROM PREVIOUS CESAREAN DELIVERY AFFECTING PREGNANCY: Primary | ICD-10-CM

## 2025-07-15 DIAGNOSIS — Z3A.17 17 WEEKS GESTATION OF PREGNANCY: ICD-10-CM

## 2025-07-15 DIAGNOSIS — Z28.39 MATERNAL VARICELLA, NON-IMMUNE: ICD-10-CM

## 2025-07-15 LAB
BILIRUB UR QL STRIP: NORMAL
GLUCOSE UR QL STRIP: NEGATIVE
KETONES UR QL STRIP: NORMAL
LEUKOCYTE ESTERASE UR QL STRIP: NEGATIVE
PH, POC UA: NORMAL
POC BLOOD, URINE: NORMAL
POC NITRATES, URINE: NEGATIVE
PROT UR QL STRIP: NEGATIVE
SP GR UR STRIP: NORMAL (ref 1–1.03)
UROBILINOGEN UR STRIP-ACNC: NORMAL (ref 0.3–2.2)

## 2025-07-28 PROBLEM — R42 VERTIGO: Status: RESOLVED | Noted: 2023-08-07 | Resolved: 2025-07-28

## 2025-07-28 PROBLEM — O99.210 OBESITY AFFECTING PREGNANCY, ANTEPARTUM: Status: ACTIVE | Noted: 2025-07-28

## 2025-07-28 PROBLEM — L03.119 CELLULITIS AND ABSCESS OF UPPER EXTREMITY: Status: RESOLVED | Noted: 2024-12-04 | Resolved: 2025-07-28

## 2025-07-28 PROBLEM — R01.1 HEART MURMUR: Status: RESOLVED | Noted: 2021-11-01 | Resolved: 2025-07-28

## 2025-07-28 PROBLEM — T78.40XA ALLERGIC REACTION: Status: RESOLVED | Noted: 2024-03-06 | Resolved: 2025-07-28

## 2025-07-28 PROBLEM — O09.219 PREVIOUS PRETERM DELIVERY, ANTEPARTUM: Status: ACTIVE | Noted: 2025-07-28

## 2025-07-28 PROBLEM — Z3A.08 8 WEEKS GESTATION OF PREGNANCY: Status: RESOLVED | Noted: 2025-05-12 | Resolved: 2025-07-28

## 2025-07-28 PROBLEM — L02.419 CELLULITIS AND ABSCESS OF UPPER EXTREMITY: Status: RESOLVED | Noted: 2024-12-04 | Resolved: 2025-07-28
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
## 2025-07-28 PROBLEM — S50.862A INSECT BITE OF LEFT FOREARM: Status: RESOLVED | Noted: 2024-12-04 | Resolved: 2025-07-28

## 2025-07-28 PROBLEM — R10.84 GENERALIZED ABDOMINAL PAIN: Status: RESOLVED | Noted: 2025-02-24 | Resolved: 2025-07-28

## 2025-07-28 PROBLEM — D64.9 ANEMIA: Status: RESOLVED | Noted: 2021-11-01 | Resolved: 2025-07-28

## 2025-07-28 PROBLEM — O20.9 VAGINAL BLEEDING AFFECTING EARLY PREGNANCY: Status: RESOLVED | Noted: 2025-05-12 | Resolved: 2025-07-28

## 2025-07-28 PROBLEM — L50.9 URTICARIA: Status: RESOLVED | Noted: 2024-03-06 | Resolved: 2025-07-28

## 2025-07-28 PROBLEM — R07.89 CHEST WALL PAIN: Status: RESOLVED | Noted: 2023-02-28 | Resolved: 2025-07-28

## 2025-07-28 PROBLEM — O35.2XX0 MATERNAL CARE FOR SUSPECTED HEREDITARY DISEASE IN FETUS: Status: ACTIVE | Noted: 2025-07-28

## 2025-07-28 NOTE — PROGRESS NOTES
Maternal Fetal Medicine New Consult    Subjective:     Patient ID: [id number]    Chief Complaint: MFM Consult w/us  (For family h/o Shama's syndrome )      HPI: 25 y.o.  female  at 19w6d gestation with Estimated Date of Delivery: 25 by LMP, consistent with early US. She is sent for MFM consultation for family history of Shama syndrome.     Her last child has Shama syndrome.  She reports had genetic consultation and workup and was diagnosed with [place] syndrome also.  She has history of  labor and delivery at 36 weeks in her last pregnancy (via  due to NRFHT).  Her BMI was 30.7 at initial OB visit.       She denies any personal or family history of aneuploidy or anomalies. She denies any exposure to high fevers, viral rashes, illicit drugs or alcohol in this pregnancy.  She denies any leaking fluid, vaginal bleeding, contractions, decreased fetal movement. Denies headaches, visual disturbances, or epigastric pain.       Pregnancy complications include:  Problem List[1]    Past Medical History:   Diagnosis Date    Anemia     dx at the age of 20    Anxiety disorder, unspecified     age [age]    Asthma     dx as a child    Bulging disc     Heart murmur     Shama syndrome     dx age 24    Scoliosis        Past Surgical History:   Procedure Laterality Date     SECTION  2022    CHOLECYSTECTOMY  2022       Family History   Problem Relation Name Age of Onset    Diabetes Maternal Grandmother      Kidney disease Maternal Grandmother      Hypertension Mother      Miscarriages / Stillbirths Sister      Hypertension Sister      Eclampsia Sister      Breast cancer Maternal Aunt          age unknown    Colon cancer Neg Hx      Ovarian cancer Neg Hx      Uterine cancer Neg Hx         Social History     Socioeconomic History    Marital status: Single   Tobacco Use    Smoking status: Never     Passive exposure: Never    Smokeless tobacco: Never   Substance and Sexual Activity     Alcohol use: Not Currently     Comment: occiassionally    Drug use: Never    Sexual activity: Not Currently     Partners: Male     Birth control/protection: None       Current Medications[2]    Review of patient's allergies indicates:  No Known Allergies     Review of Systems   12 point review of systems conducted, negative except as stated in the history of present illness. See HPI for details.      Objective:     Visit Vitals  /70 (BP Location: Left arm, Patient Position: Sitting)   Pulse 83   Ht 5' (1.524 m)   Wt 72.1 kg (159 lb)   LMP 03/13/2025 (Exact Date)   BMI 31.05 kg/m²        Physical Exam  Vitals and nursing note reviewed.   Constitutional:       General: She is not in acute distress.     Appearance: Normal appearance.      Comments: Increased BMI   HENT:      Head: Normocephalic and atraumatic.      Nose: Nose normal. No congestion.      Mouth/Throat:      Pharynx: Oropharynx is clear.   Eyes:      General: No scleral icterus.     Conjunctiva/sclera: Conjunctivae normal.   Cardiovascular:      Rate and Rhythm: Normal rate and regular rhythm.   Pulmonary:      Effort: No respiratory distress.      Breath sounds: Normal breath sounds. No wheezing.   Abdominal:      General: Abdomen is flat.      Palpations: Abdomen is soft.      Tenderness: There is no abdominal tenderness. There is no right CVA tenderness, left CVA tenderness or guarding.      Comments: No CVA tenderness gravid uterus.    Musculoskeletal:         General: Normal range of motion.      Cervical back: Neck supple.      Right lower leg: No edema.      Left lower leg: No edema.   Skin:     General: Skin is warm.      Findings: No bruising or rash.   Neurological:      General: No focal deficit present.      Mental Status: She is oriented to person, place, and time.      Deep Tendon Reflexes: Reflexes normal.      Comments: Normal reflexes   Psychiatric:         Mood and Affect: Mood normal.         Behavior: Behavior normal.          Thought Content: Thought content normal.         Judgment: Judgment normal.         Assessment/Plan:     25 y.o.  female with IUP at 19w6d    Previous child with Shama syndrome  There is normal fetal growth and no anomalies seen on fetal anatomy scan on 25.  AFV is normal.    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 The classical features include short stature and congenital heart disease (CHD). However, the clinical features of NS can affect all body systems, and a subtle constellation of clinical signs and symptoms may be observed. These include difficulties with feeding in early life; vision, hearing, and growth problems; specific learning difficulties; and easy bruising and bleeding.      The recurrence risk for Shama syndrome depends on whether a parent is affected. If a parent has Cecilia syndrome, the recurrence risk for each child is 50%. If the affected child has a de jose mutation (meaning neither parent has the condition), the recurrence risk for subsequent siblings is very low, typically less than 1%.     The diagnosis of NS often can be made on clinical features alone. However, achieving a correct diagnosis is increasingly important, given the emerging therapeutic implications and the need for increased health surveillance across the lifespan for people with NS. Thus, molecular testing is usually attempted to confirm the clinical diagnosis and is indicated in many different clinical contexts    Prenatal diagnosis of Shama syndrome is challenging due to variable and sometimes subtle ultrasound findings, but can be suspected through specific ultrasound markers combined with DNA testing. Several studies have supported NS variant testing in pregnancy in the presence of nuchal thickening along with cystic hygroma, polyhydramnios, hydrops, kidney anomalies, distended jugular lymphatic sacs, hydrothorax, or cardiac anomalies. Variant detection  rates of 17 percent are reported, depending upon gestation, pattern of abnormalities, and the number of genes tested.    She was advised that the only diagnostic test at this time is genetic amniocentesis.  Benefits and risks of a genetic amniocentesis were discussed. Patient was offered genetic amniocentesis, after thorough counseling on benefits and risks of procedure, with very remote risk of complications and in some studies no identifiable increased risk, above background risk of spontaneous miscarriage, while some current data suggests risk up to 1 in 800 with early amniocentesis prior to 24 weeks, and remote risk of need for emergency delivery with all the complications of prematurity with amniocentesis after 24 weeks. She declined amniocentesis . She is aware of the need for  evaluation.    Discussed option of consultation with genetic counselor, and patient previously had discussion and politely declined another referral.      Previous  delivery  I discussed with her the increased risk of recurrence of another  delivery with risk around 1/3. The risk ranges from 15% with one previous  delivery after 32 weeks that was followed by a term at birth to 60% with history of 2 consecutive  deliveries before 32 weeks. I have shared with her that the  delivery could occur at an earlier gestational age with more significant morbidity and high risk of  mortality associated with that.    Discussed withdrawal of 17P by FDA and new guidelines for management of previous  delivery. Recommend intermittent transvaginal ultrasounds starting at 16 weeks until 23 weeks. If cervical length is less than 3 cm, weekly TVUS is recommended and may consider vaginal progesterone nightly. If cervix is less than 25 mm, consider cerclage, especially if previous  delivery less 28 weeks, than along with continuing vaginal progesterone nightly. Conversely, may consider nightly  vaginal progesterone nightly starting at 16 weeks until 37 weeks regardless of cervical length, although no data of benefit if cervix longer than 3 cm. Discussed risks/benefits of all options, understanding that neither option is fully protective against pregnancy loss. She would like to avoid vaginal progesterone and do cervical surveillance at this time.    TVUS today 2025  with normal closed cervix 3.7 cm without funneling at the IO. No evidence of cervical insufficiency at this time. Will recheck in about 2 weeks. PTL precautions given.    Will plan to do TVUS intermittently until 24 weeks. If cervical length less than 3 cm, will consider vaginal progesterone at that time. If cervical length less than 2.5 cm, consider cerclage along with vaginal progesterone. PTL precautions given.     Placental lake  Placental lakes are enlarged spaces in the placenta filled with maternal blood and are considered to be a normal finding in most cases. However, multiple placental lakes may increase risk for fetal growth restriction. Out of caution, will recheck around 30 weeks. Expectant management.       BMI over 30  Body mass index is 31.05 kg/m².    I discussed the risk of miscarriage in first trimester, recurrent miscarriages, congenital anomalies, hypertension, diabetes,  labor and the higher risk of  section and the higher risk of fetal demise in-utero. There is also higher risk of for excessive fetal weight and large for gestational age (LGA) fetuses. Mothers with LGA fetuses are at higher risk of prolonged labor,  delivery, shoulder dystocia and birth trauma. LGA neonates are increased risk of fetal hypoxia and intrauterine death, and are at risk to develop diabetes, obesity, metabolic syndrome, asthma and cancer later in life. She was advised of the importance of eating healthy and limiting weight gain to 11-20 lbs during the pregnancy, as optimal in this situation. I recommended low calorie,  low fat diet avoiding any additional excessive weight gain. Excess weight gain would be associated with gestational hypertension, gestational diabetes and adverse  outcomes, including fetal demise in utero.    Due to the lack of consistent evidence to indicate  and maternal benefit from early diagnosis and treatment of gestational diabetes mellitus (GDM), the American College of Obstetricians and Gynecologists (ACOG) does not recommend routine screening for GDM before 24 weeks of gestation. ACOG continues to recommend screening for pregestational diabetes mellitus before 24 weeks of gestation, preferably at the onset of prenatal care, in patients with risk factors.    We recommend 1 hour GCT between 24-28 weeks' gestation.    With elevated BMI, advised patient to start fetal kick counts at 24 weeks.       Importance of working on losing weight after the pregnancy is over, especially before a future pregnancy was discussed. Breastfeeding may be an important tool in reducing the postpartum weight retention. Fetal risks were discussed with short term risk of fetal/ obesity and long term risk of adolescent component of metabolic syndrome.       Preeclampsia prophylaxis  With her risk factors for preeclampsia including  BMI over 30,  ancestry, and low socioeconomic status, discussed recommendations for low dose aspirin use to decrease risks for adverse outcomes, including preeclampsia, low birth weight and  delivery. Low-dose aspirin reduced the risk for preeclampsia by 15% in clinical trials and reduced the risk for  birth by 20% and FGR by 20%, and  mortality by around 20%.  After discussing risks/benefits of its use, it was agreed to start asa 81 mg BID, due to multiple risk factors for preeclampsia. After discussing benefits (more effective than daily) vs potential risks (less data on safety with use at delivery), she agreed to start low dose aspirin twice daily  and continue until 34 6/7weeks, then decrease to once daily until delivery. Also, recommend using in all future pregnancies.    History of  for nonreassuring fetal heart rate tracing at 36 weeks    Patient was advised that there is no standard guidelines for the management of this situation particularly that the baby's growth was normal with no other comorbidities.  It we would be reasonable to check growth in the 3rd trimester in if normal growth no additional testing we would be recommended.    Patient is not interested in invasive testing to check for the baby being affected.  The risk of baby getting it is around 50% although variable expressivity or podiatrist might be present, as the patient does not have any symptoms.  We will check growth around 30 weeks for placental lakes and check cervical length 1 more time in 2 weeks.  Importance of healthy diet discussed.  Agreed to use aspirin b.i.d..  Follow up in about 2 weeks (around 2025) for MFM Follow-Up, TVUS; 4 weeks MFM follow-up TVUS.     Future Appointments   Date Time Provider Department Center   2025  2:20 PM Bret Medrano MD [facility]        Patient was evaluated by NAM Nava and Dr. Tripathi.  Final assessment and recommendations as stated above were made by Dr. Tripathi.    This note was created with the assistance of [employer] voice recognition software. There may be transcription errors as a result of using this technology, however minimal. Effort has been made to ensure accuracy of transcription, but any obvious errors or omissions should be clarified with the author of the document.           [1]   Patient Active Problem List  Diagnosis    S/P     Nausea and vomiting during pregnancy    Shama's syndrome    Uterine scar from previous  delivery affecting pregnancy    Maternal varicella, non-immune    Maternal care for suspected hereditary disease in fetus    Previous  delivery, antepartum    BMI  over 30 affecting pregnancy, antepartum   [2]   Current Outpatient Medications   Medication Sig Dispense Refill    PNV no.153/FA/om3/dha/epa/fish (PRENATAL GUMMIES ORAL) Take by mouth.       No current facility-administered medications for this visit.

## 2025-07-29 ENCOUNTER — TELEPHONE (OUTPATIENT)
Dept: MATERNAL FETAL MEDICINE | Facility: CLINIC | Age: 25
End: 2025-07-29
Payer: COMMERCIAL

## 2025-07-30 ENCOUNTER — PROCEDURE VISIT (OUTPATIENT)
Dept: MATERNAL FETAL MEDICINE | Facility: CLINIC | Age: 25
End: 2025-07-30
Payer: COMMERCIAL

## 2025-07-30 ENCOUNTER — OFFICE VISIT (OUTPATIENT)
Dept: MATERNAL FETAL MEDICINE | Facility: CLINIC | Age: 25
End: 2025-07-30
Payer: COMMERCIAL

## 2025-07-30 VITALS
HEART RATE: 83 BPM | HEIGHT: 60 IN | DIASTOLIC BLOOD PRESSURE: 70 MMHG | BODY MASS INDEX: 31.22 KG/M2 | SYSTOLIC BLOOD PRESSURE: 102 MMHG | WEIGHT: 159 LBS

## 2025-07-30 DIAGNOSIS — O35.2XX0 MATERNAL CARE FOR SUSPECTED HEREDITARY DISEASE IN FETUS, SINGLE OR UNSPECIFIED FETUS: ICD-10-CM

## 2025-07-30 DIAGNOSIS — Q87.19 NOONAN'S SYNDROME: Primary | ICD-10-CM

## 2025-07-30 DIAGNOSIS — Z36.89 ENCOUNTER FOR FETAL ANATOMIC SURVEY: ICD-10-CM

## 2025-07-30 DIAGNOSIS — O99.210 OBESITY AFFECTING PREGNANCY, ANTEPARTUM, UNSPECIFIED OBESITY TYPE: ICD-10-CM

## 2025-07-30 DIAGNOSIS — O09.219 PREVIOUS PRETERM DELIVERY, ANTEPARTUM: ICD-10-CM

## 2025-07-30 DIAGNOSIS — O09.212 SUPERVISION OF PREGNANCY WITH HISTORY OF PRE-TERM LABOR IN SECOND TRIMESTER: ICD-10-CM

## 2025-07-30 DIAGNOSIS — Z3A.12 12 WEEKS GESTATION OF PREGNANCY: ICD-10-CM

## 2025-07-30 RX ORDER — ASPIRIN 81 MG/1
81 TABLET ORAL 2 TIMES DAILY
Qty: 60 TABLET | Refills: 11 | Status: SHIPPED | OUTPATIENT
Start: 2025-07-30 | End: 2026-07-30

## 2025-07-31 ENCOUNTER — PATIENT MESSAGE (OUTPATIENT)
Dept: OTHER | Facility: OTHER | Age: 25
End: 2025-07-31
Payer: COMMERCIAL

## 2025-08-04 DIAGNOSIS — O09.219 PREVIOUS PRETERM DELIVERY, ANTEPARTUM: Primary | ICD-10-CM

## 2025-08-09 ENCOUNTER — HOSPITAL ENCOUNTER (OUTPATIENT)
Facility: HOSPITAL | Age: 25
Discharge: HOME | End: 2025-08-09
Attending: OBSTETRICS & GYNECOLOGY | Admitting: OBSTETRICS & GYNECOLOGY
Payer: COMMERCIAL

## 2025-08-09 VITALS — SYSTOLIC BLOOD PRESSURE: 101 MMHG | DIASTOLIC BLOOD PRESSURE: 57 MMHG | HEART RATE: 96 BPM | TEMPERATURE: 98 F

## 2025-08-09 DIAGNOSIS — Z36.89 ENCOUNTER FOR TRIAGE IN PREGNANT PATIENT: ICD-10-CM

## 2025-08-09 LAB
ALBUMIN SERPL-MCNC: 3.1 G/DL (ref 3.5–5)
ALBUMIN/GLOB SERPL: 0.9 RATIO (ref 1.1–2)
ALP SERPL-CCNC: 97 UNIT/L (ref 40–150)
ALT SERPL-CCNC: 7 UNIT/L (ref 0–55)
ANION GAP SERPL CALC-SCNC: 9 MEQ/L
AST SERPL-CCNC: 16 UNIT/L (ref 11–45)
BASOPHILS # BLD AUTO: 0.03 X10(3)/MCL (ref 0.01–0.08)
BASOPHILS NFR BLD AUTO: 0.2 % (ref 0.1–1.2)
BILIRUB SERPL-MCNC: 0.3 MG/DL
BILIRUB UR QL STRIP.AUTO: NEGATIVE
BUN SERPL-MCNC: 5 MG/DL (ref 7–18.7)
CALCIUM SERPL-MCNC: 8.3 MG/DL (ref 8.4–10.2)
CHLORIDE SERPL-SCNC: 106 MMOL/L (ref 98–107)
CLARITY UR: CLEAR
CO2 SERPL-SCNC: 20 MMOL/L (ref 22–29)
COLOR UR AUTO: YELLOW
CREAT SERPL-MCNC: 0.52 MG/DL (ref 0.55–1.02)
CREAT/UREA NIT SERPL: 10
EOSINOPHIL # BLD AUTO: 0.08 X10(3)/MCL (ref 0.04–0.36)
EOSINOPHIL NFR BLD AUTO: 0.7 % (ref 0.7–7)
ERYTHROCYTE [DISTWIDTH] IN BLOOD BY AUTOMATED COUNT: 15.9 % (ref 11–14.5)
GFR SERPLBLD CREATININE-BSD FMLA CKD-EPI: >90 ML/MIN/1.73/M2
GLOBULIN SER-MCNC: 3.6 GM/DL (ref 2.4–3.5)
GLUCOSE SERPL-MCNC: 69 MG/DL (ref 74–100)
GLUCOSE UR QL STRIP: NEGATIVE
HCT VFR BLD AUTO: 35.9 % (ref 36–48)
HGB BLD-MCNC: 11 G/DL (ref 11.8–16)
HGB UR QL STRIP: NEGATIVE
IMM GRANULOCYTES # BLD AUTO: 0.05 X10(3)/MCL (ref 0–0.03)
IMM GRANULOCYTES NFR BLD AUTO: 0.4 % (ref 0–0.5)
KETONES UR QL STRIP: 15
LEUKOCYTE ESTERASE UR QL STRIP: NEGATIVE
LYMPHOCYTES # BLD AUTO: 1.69 X10(3)/MCL (ref 1.16–3.74)
LYMPHOCYTES NFR BLD AUTO: 13.8 % (ref 20–55)
MCH RBC QN AUTO: 23 PG (ref 27–34)
MCHC RBC AUTO-ENTMCNC: 30.6 G/DL (ref 31–37)
MCV RBC AUTO: 74.9 FL (ref 79–99)
MONOCYTES # BLD AUTO: 1.35 X10(3)/MCL (ref 0.24–0.36)
MONOCYTES NFR BLD AUTO: 11 % (ref 4.7–12.5)
NEUTROPHILS # BLD AUTO: 9.02 X10(3)/MCL (ref 1.56–6.13)
NEUTROPHILS NFR BLD AUTO: 73.9 % (ref 37–73)
NITRITE UR QL STRIP: NEGATIVE
NRBC BLD AUTO-RTO: 0 %
PH UR STRIP: 7 [PH]
PLATELET # BLD AUTO: 213 X10(3)/MCL (ref 140–371)
PMV BLD AUTO: 10.1 FL (ref 9.4–12.4)
POTASSIUM SERPL-SCNC: 3.5 MMOL/L (ref 3.5–5.1)
PROT SERPL-MCNC: 6.7 GM/DL (ref 6.4–8.3)
PROT UR QL STRIP: NEGATIVE
RBC # BLD AUTO: 4.79 X10(6)/MCL (ref 4–5.1)
SODIUM SERPL-SCNC: 135 MMOL/L (ref 136–145)
SP GR UR STRIP.AUTO: 1.01 (ref 1–1.03)
UROBILINOGEN UR STRIP-ACNC: 0.2
WBC # BLD AUTO: 12.22 X10(3)/MCL (ref 4–11.5)

## 2025-08-09 PROCEDURE — 80053 COMPREHEN METABOLIC PANEL: CPT | Performed by: OBSTETRICS & GYNECOLOGY

## 2025-08-09 PROCEDURE — 87661 TRICHOMONAS VAGINALIS AMPLIF: CPT | Performed by: OBSTETRICS & GYNECOLOGY

## 2025-08-09 PROCEDURE — 85025 COMPLETE CBC W/AUTO DIFF WBC: CPT | Performed by: OBSTETRICS & GYNECOLOGY

## 2025-08-09 PROCEDURE — 81003 URINALYSIS AUTO W/O SCOPE: CPT | Performed by: OBSTETRICS & GYNECOLOGY

## 2025-08-09 PROCEDURE — 25000003 PHARM REV CODE 250: Performed by: OBSTETRICS & GYNECOLOGY

## 2025-08-09 PROCEDURE — 36415 COLL VENOUS BLD VENIPUNCTURE: CPT | Performed by: OBSTETRICS & GYNECOLOGY

## 2025-08-09 PROCEDURE — 87491 CHLMYD TRACH DNA AMP PROBE: CPT | Performed by: OBSTETRICS & GYNECOLOGY

## 2025-08-09 RX ORDER — SODIUM CHLORIDE, SODIUM LACTATE, POTASSIUM CHLORIDE, CALCIUM CHLORIDE 600; 310; 30; 20 MG/100ML; MG/100ML; MG/100ML; MG/100ML
INJECTION, SOLUTION INTRAVENOUS CONTINUOUS
Status: DISCONTINUED | OUTPATIENT
Start: 2025-08-09 | End: 2025-08-09 | Stop reason: HOSPADM

## 2025-08-09 RX ORDER — ACETAMINOPHEN 500 MG
500 TABLET ORAL EVERY 6 HOURS PRN
Status: DISCONTINUED | OUTPATIENT
Start: 2025-08-09 | End: 2025-08-09 | Stop reason: HOSPADM

## 2025-08-09 RX ORDER — ONDANSETRON 4 MG/1
8 TABLET, ORALLY DISINTEGRATING ORAL EVERY 8 HOURS PRN
Status: DISCONTINUED | OUTPATIENT
Start: 2025-08-09 | End: 2025-08-09 | Stop reason: HOSPADM

## 2025-08-09 RX ADMIN — ACETAMINOPHEN 500 MG: 500 TABLET ORAL at 06:08

## 2025-08-11 ENCOUNTER — TELEPHONE (OUTPATIENT)
Dept: OBSTETRICS AND GYNECOLOGY | Facility: CLINIC | Age: 25
End: 2025-08-11
Payer: COMMERCIAL

## 2025-08-11 ENCOUNTER — ROUTINE PRENATAL (OUTPATIENT)
Dept: OBSTETRICS AND GYNECOLOGY | Facility: CLINIC | Age: 25
End: 2025-08-11
Payer: COMMERCIAL

## 2025-08-11 VITALS — BODY MASS INDEX: 31.25 KG/M2 | WEIGHT: 160 LBS | SYSTOLIC BLOOD PRESSURE: 120 MMHG | DIASTOLIC BLOOD PRESSURE: 68 MMHG

## 2025-08-11 DIAGNOSIS — O09.219 PREVIOUS PRETERM DELIVERY, ANTEPARTUM: ICD-10-CM

## 2025-08-11 DIAGNOSIS — Z3A.21 21 WEEKS GESTATION OF PREGNANCY: ICD-10-CM

## 2025-08-11 DIAGNOSIS — Q87.19 NOONAN'S SYNDROME: ICD-10-CM

## 2025-08-11 DIAGNOSIS — Z28.39 MATERNAL VARICELLA, NON-IMMUNE: ICD-10-CM

## 2025-08-11 DIAGNOSIS — O34.219 UTERINE SCAR FROM PREVIOUS CESAREAN DELIVERY AFFECTING PREGNANCY: Primary | ICD-10-CM

## 2025-08-11 DIAGNOSIS — O09.899 MATERNAL VARICELLA, NON-IMMUNE: ICD-10-CM

## 2025-08-11 DIAGNOSIS — R10.11 RUQ PAIN: ICD-10-CM

## 2025-08-11 DIAGNOSIS — O99.210 OBESITY AFFECTING PREGNANCY, ANTEPARTUM, UNSPECIFIED OBESITY TYPE: ICD-10-CM

## 2025-08-11 PROCEDURE — 0502F SUBSEQUENT PRENATAL CARE: CPT | Mod: CPTII,,, | Performed by: OBSTETRICS & GYNECOLOGY

## 2025-08-12 LAB
C TRACH RRNA SPEC QL NAA+PROBE: NEGATIVE
N GONORRHOEA RRNA SPEC QL NAA+PROBE: NEGATIVE
SPECIMEN SOURCE: NORMAL
T VAGINALIS RRNA SPEC QL NAA+PROBE: NEGATIVE

## 2025-08-13 ENCOUNTER — TELEPHONE (OUTPATIENT)
Dept: MATERNAL FETAL MEDICINE | Facility: CLINIC | Age: 25
End: 2025-08-13
Payer: COMMERCIAL

## 2025-08-13 ENCOUNTER — TELEPHONE (OUTPATIENT)
Dept: OBSTETRICS AND GYNECOLOGY | Facility: CLINIC | Age: 25
End: 2025-08-13
Payer: COMMERCIAL

## 2025-08-14 ENCOUNTER — HOSPITAL ENCOUNTER (OUTPATIENT)
Dept: RADIOLOGY | Facility: HOSPITAL | Age: 25
Discharge: HOME | End: 2025-08-14
Attending: OBSTETRICS & GYNECOLOGY
Payer: COMMERCIAL

## 2025-08-14 DIAGNOSIS — R10.11 RUQ PAIN: ICD-10-CM

## 2025-08-14 PROCEDURE — 76705 ECHO EXAM OF ABDOMEN: CPT | Mod: TC

## 2025-08-15 ENCOUNTER — TELEPHONE (OUTPATIENT)
Dept: OBSTETRICS AND GYNECOLOGY | Facility: CLINIC | Age: 25
End: 2025-08-15
Payer: COMMERCIAL

## 2025-08-19 ENCOUNTER — TELEPHONE (OUTPATIENT)
Dept: OBSTETRICS AND GYNECOLOGY | Facility: CLINIC | Age: 25
End: 2025-08-19
Payer: COMMERCIAL

## 2025-08-25 ENCOUNTER — INITIAL PRENATAL (OUTPATIENT)
Dept: OBSTETRICS AND GYNECOLOGY | Facility: CLINIC | Age: 25
End: 2025-08-25
Payer: COMMERCIAL

## 2025-08-25 VITALS
HEART RATE: 105 BPM | BODY MASS INDEX: 31.44 KG/M2 | SYSTOLIC BLOOD PRESSURE: 112 MMHG | WEIGHT: 161 LBS | DIASTOLIC BLOOD PRESSURE: 73 MMHG

## 2025-08-25 DIAGNOSIS — Z3A.23 23 WEEKS GESTATION OF PREGNANCY: ICD-10-CM

## 2025-08-25 DIAGNOSIS — Z34.82 ENCOUNTER FOR SUPERVISION OF OTHER NORMAL PREGNANCY IN SECOND TRIMESTER: Primary | ICD-10-CM

## 2025-08-25 PROCEDURE — 0502F SUBSEQUENT PRENATAL CARE: CPT | Mod: CPTII,,, | Performed by: STUDENT IN AN ORGANIZED HEALTH CARE EDUCATION/TRAINING PROGRAM

## 2025-08-27 ENCOUNTER — TELEPHONE (OUTPATIENT)
Dept: MATERNAL FETAL MEDICINE | Facility: CLINIC | Age: 25
End: 2025-08-27

## 2025-08-28 ENCOUNTER — PATIENT MESSAGE (OUTPATIENT)
Dept: OTHER | Facility: OTHER | Age: 25
End: 2025-08-28
Payer: COMMERCIAL